# Patient Record
Sex: FEMALE | Race: WHITE | NOT HISPANIC OR LATINO | Employment: FULL TIME | URBAN - METROPOLITAN AREA
[De-identification: names, ages, dates, MRNs, and addresses within clinical notes are randomized per-mention and may not be internally consistent; named-entity substitution may affect disease eponyms.]

---

## 2017-01-26 ENCOUNTER — HOSPITAL ENCOUNTER (INPATIENT)
Facility: MEDICAL CENTER | Age: 22
LOS: 2 days | End: 2017-01-28
Attending: OBSTETRICS & GYNECOLOGY | Admitting: OBSTETRICS & GYNECOLOGY
Payer: COMMERCIAL

## 2017-01-26 LAB
BASOPHILS # BLD AUTO: 0.5 % (ref 0–1.8)
BASOPHILS # BLD: 0.1 K/UL (ref 0–0.12)
EOSINOPHIL # BLD AUTO: 0.18 K/UL (ref 0–0.51)
EOSINOPHIL NFR BLD: 1 % (ref 0–6.9)
ERYTHROCYTE [DISTWIDTH] IN BLOOD BY AUTOMATED COUNT: 40 FL (ref 35.9–50)
HCT VFR BLD AUTO: 37 % (ref 37–47)
HGB BLD-MCNC: 12.1 G/DL (ref 12–16)
HOLDING TUBE BB 8507: NORMAL
IMM GRANULOCYTES # BLD AUTO: 0.21 K/UL (ref 0–0.11)
IMM GRANULOCYTES NFR BLD AUTO: 1.1 % (ref 0–0.9)
LYMPHOCYTES # BLD AUTO: 1.95 K/UL (ref 1–4.8)
LYMPHOCYTES NFR BLD: 10.6 % (ref 22–41)
MCH RBC QN AUTO: 29 PG (ref 27–33)
MCHC RBC AUTO-ENTMCNC: 32.7 G/DL (ref 33.6–35)
MCV RBC AUTO: 88.7 FL (ref 81.4–97.8)
MONOCYTES # BLD AUTO: 1.43 K/UL (ref 0–0.85)
MONOCYTES NFR BLD AUTO: 7.7 % (ref 0–13.4)
NEUTROPHILS # BLD AUTO: 14.59 K/UL (ref 2–7.15)
NEUTROPHILS NFR BLD: 79.1 % (ref 44–72)
NRBC # BLD AUTO: 0 K/UL
NRBC BLD AUTO-RTO: 0 /100 WBC
PLATELET # BLD AUTO: 304 K/UL (ref 164–446)
PMV BLD AUTO: 9.9 FL (ref 9–12.9)
RBC # BLD AUTO: 4.17 M/UL (ref 4.2–5.4)
WBC # BLD AUTO: 18.5 K/UL (ref 4.8–10.8)

## 2017-01-26 PROCEDURE — A9270 NON-COVERED ITEM OR SERVICE: HCPCS | Performed by: OBSTETRICS & GYNECOLOGY

## 2017-01-26 PROCEDURE — 770007 HCHG ROOM/CARE - OB SEMI PRIVATE (*

## 2017-01-26 PROCEDURE — 85025 COMPLETE CBC W/AUTO DIFF WBC: CPT

## 2017-01-26 PROCEDURE — 0HQ9XZZ REPAIR PERINEUM SKIN, EXTERNAL APPROACH: ICD-10-PCS | Performed by: OBSTETRICS & GYNECOLOGY

## 2017-01-26 PROCEDURE — 700111 HCHG RX REV CODE 636 W/ 250 OVERRIDE (IP): Performed by: OBSTETRICS & GYNECOLOGY

## 2017-01-26 PROCEDURE — 700102 HCHG RX REV CODE 250 W/ 637 OVERRIDE(OP): Performed by: OBSTETRICS & GYNECOLOGY

## 2017-01-26 RX ORDER — ONDANSETRON 4 MG/1
4 TABLET, ORALLY DISINTEGRATING ORAL EVERY 6 HOURS PRN
Status: DISCONTINUED | OUTPATIENT
Start: 2017-01-26 | End: 2017-01-27

## 2017-01-26 RX ORDER — MISOPROSTOL 200 UG/1
800 TABLET ORAL
Status: DISCONTINUED | OUTPATIENT
Start: 2017-01-26 | End: 2017-01-27

## 2017-01-26 RX ORDER — METHYLERGONOVINE MALEATE 0.2 MG/ML
0.2 INJECTION INTRAVENOUS
Status: DISCONTINUED | OUTPATIENT
Start: 2017-01-26 | End: 2017-01-27

## 2017-01-26 RX ORDER — SODIUM CHLORIDE, SODIUM LACTATE, POTASSIUM CHLORIDE, CALCIUM CHLORIDE 600; 310; 30; 20 MG/100ML; MG/100ML; MG/100ML; MG/100ML
INJECTION, SOLUTION INTRAVENOUS CONTINUOUS
Status: DISCONTINUED | OUTPATIENT
Start: 2017-01-26 | End: 2017-01-27

## 2017-01-26 RX ORDER — OXYCODONE HYDROCHLORIDE AND ACETAMINOPHEN 5; 325 MG/1; MG/1
1 TABLET ORAL EVERY 4 HOURS PRN
Status: DISCONTINUED | OUTPATIENT
Start: 2017-01-26 | End: 2017-01-27

## 2017-01-26 RX ORDER — OXYCODONE AND ACETAMINOPHEN 10; 325 MG/1; MG/1
1 TABLET ORAL EVERY 4 HOURS PRN
Status: DISCONTINUED | OUTPATIENT
Start: 2017-01-26 | End: 2017-01-27

## 2017-01-26 RX ORDER — IBUPROFEN 800 MG/1
800 TABLET ORAL EVERY 6 HOURS PRN
Status: DISCONTINUED | OUTPATIENT
Start: 2017-01-26 | End: 2017-01-27

## 2017-01-26 RX ORDER — ACETAMINOPHEN 325 MG/1
650 TABLET ORAL EVERY 6 HOURS PRN
Status: ON HOLD | COMMUNITY
End: 2017-01-28

## 2017-01-26 RX ORDER — ONDANSETRON 2 MG/ML
4 INJECTION INTRAMUSCULAR; INTRAVENOUS EVERY 6 HOURS PRN
Status: DISCONTINUED | OUTPATIENT
Start: 2017-01-26 | End: 2017-01-27

## 2017-01-26 RX ADMIN — Medication 125 ML/HR: at 23:46

## 2017-01-26 RX ADMIN — SODIUM CHLORIDE, POTASSIUM CHLORIDE, SODIUM LACTATE AND CALCIUM CHLORIDE: 600; 310; 30; 20 INJECTION, SOLUTION INTRAVENOUS at 19:00

## 2017-01-26 RX ADMIN — ONDANSETRON 4 MG: 2 INJECTION, SOLUTION INTRAMUSCULAR; INTRAVENOUS at 20:20

## 2017-01-26 RX ADMIN — Medication 1 MILLI-UNITS/MIN: at 19:08

## 2017-01-26 RX ADMIN — FENTANYL CITRATE 100 MCG: 50 INJECTION, SOLUTION INTRAMUSCULAR; INTRAVENOUS at 20:56

## 2017-01-26 RX ADMIN — Medication 2000 ML/HR: at 22:40

## 2017-01-26 RX ADMIN — OXYCODONE HYDROCHLORIDE AND ACETAMINOPHEN 1 TABLET: 10; 325 TABLET ORAL at 22:57

## 2017-01-26 RX ADMIN — IBUPROFEN 800 MG: 800 TABLET, FILM COATED ORAL at 22:57

## 2017-01-26 RX ADMIN — FENTANYL CITRATE 100 MCG: 50 INJECTION, SOLUTION INTRAMUSCULAR; INTRAVENOUS at 21:55

## 2017-01-26 ASSESSMENT — LIFESTYLE VARIABLES
EVER_SMOKED: NEVER
ALCOHOL_USE: NO
DO YOU DRINK ALCOHOL: NO

## 2017-01-26 ASSESSMENT — COPD QUESTIONNAIRES
HAVE YOU SMOKED AT LEAST 100 CIGARETTES IN YOUR ENTIRE LIFE: NO/DON'T KNOW
DO YOU EVER COUGH UP ANY MUCUS OR PHLEGM?: NO/ONLY WITH OCCASIONAL COLDS OR INFECTIONS
DURING THE PAST 4 WEEKS HOW MUCH DID YOU FEEL SHORT OF BREATH: NONE/LITTLE OF THE TIME

## 2017-01-26 ASSESSMENT — PATIENT HEALTH QUESTIONNAIRE - PHQ9
SUM OF ALL RESPONSES TO PHQ9 QUESTIONS 1 AND 2: 0
SUM OF ALL RESPONSES TO PHQ QUESTIONS 1-9: 0
1. LITTLE INTEREST OR PLEASURE IN DOING THINGS: NOT AT ALL
2. FEELING DOWN, DEPRESSED, IRRITABLE, OR HOPELESS: NOT AT ALL

## 2017-01-26 NOTE — IP AVS SNAPSHOT
1/28/2017          Nick Bourgeois  [address]  [facility] [address]    Dear Nick:    [facility] wants to ensure your discharge home is safe and you or your loved ones have had all your questions answered regarding your care after you leave the hospital.    You may receive a telephone call within two days of your discharge.  This call is to make certain you understand your discharge instructions as well as ensure we provided you with the best care possible during your stay with us.     The call will only last approximately 3-5 minutes and will be done by a nurse.    Once again, we want to ensure your discharge home is safe and that you have a clear understanding of any next steps in your care.  If you have any questions or concerns, please do not hesitate to contact us, we are here for you.  Thank you for choosing [facility] for your healthcare needs.    Sincerely,    Paddy Mares    [facility]

## 2017-01-26 NOTE — IP AVS SNAPSHOT
After Visit Summary                                                                                                                Nick Bourgeois   MRN: [id number]    Department:  POST PARTUM 31   2017           Follow-up Information     1. Follow up with Corinne E Capurro, M.D. In 6 weeks.    Specialty:  OB/Gyn    Contact information    [address]  [address]  [place] [address]  [phone]         I assume responsibility for securing a follow-up Fay Screening blood test on my baby within the specified date range.    -                  Discharge Instructions       POSTPARTUM DISCHARGE INSTRUCTIONS FOR MOM    YOB: 1995   Age: 21 y.o.               Admit Date: 2017     Discharge Date: 2017  Attending Doctor:  Corinne E Capurro, M.D.                  Allergies:  Review of patient's allergies indicates no known allergies.    Discharged to home by car. Discharged via wheelchair, hospital escort: Yes.  Special equipment needed: Not Applicable  Belongings with: Personal  Be sure to schedule a follow-up appointment with your primary care doctor or any specialists as instructed.     Discharge Plan:   Diet Plan: Discussed  Activity Level: Discussed  Confirmed Follow up Appointment: Patient to Call and Schedule Appointment  Confirmed Symptoms Management: Discussed  Medication Reconciliation Updated: Yes  Influenza Vaccine Indication: Patient Refuses    REASONS TO CALL YOUR OBSTETRICIAN:  1.   Persistent fever or shaking chills (Temperature higher than 100.4)  2.   Heavy bleeding (soaking more than 1 pad per hour); Passing clots  3.   Foul odor from vagina  4.   Mastitis (Breast infection; breast pain, chills, fever, redness)  5.   Urinary pain, burning or frequency  6.   Episiotomy infection  7.   Abdominal incision infection  8.   Severe depression longer than 24 hours    HAND WASHING  · Prior to handling the baby.  · Before breastfeeding or bottle feeding baby.  · After using the  "bathroom or changing the baby's diaper.    WOUND CARE  Ask your physician for additional care instructions.  In general:    ·  Incision:      · Keep clean and dry.    · Do NOT lift anything heavier than your baby for up to 6 weeks.    · There should not be any opening or pus.      VAGINAL CARE  · Nothing inside vagina for 6 weeks: no sexual intercourse, tampons or douching.  · Bleeding may continue for 2-4 weeks.  Amount may vary.    · Call your physician for heavy bleeding which means soaking more than 1 pad per hour    BIRTH CONTROL  · It is possible to become pregnant at any time after delivery and while breastfeeding.  · Plan to discuss a method of birth control with your physician at your follow up visit. visit.    DIET AND ELIMINATION  · Eating more fiber (bran cereal, fruits, and vegetables) and drinking plenty of fluids will help to avoid constipation.  · Urinary frequency after childbirth is normal.    POSTPARTUM BLUES  During the first few days after birth, you may experience a sense of the \"blues\" which may include impatience, irritability or even crying.  These feeling come and go quickly.  However, as many as 1 in 10 women experience emotional symptoms known as postpartum depression.    Postpartum depression:  May start as early as the second or third day after delivery or take several weeks or months to develop.  Symptoms of \"blues\" are present, but are more intense:  Crying spells; loss of appetite; feelings of hopelessness or loss of control; fear of touching the baby; over concern or no concern at all about the baby; little or no concern about your own appearance/caring for yourself; and/or inability to sleep or excessive sleeping.  Contact your physician if you are experiencing any of these symptoms.    Crisis Hotline:  · National Crisis Hotline:  1-011-ZBYFLSP  Or 1-422.311.2013  · [place] Crisis Hotline:  1-899.473.5346  Or 941-228-2110    PREVENTING SHAKEN BABY:  If you are angry or " "stressed, PUT THE BABY IN THE CRIB, step away, take some deep breaths, and wait until you are calm to care for the baby.  DO NOT SHAKE THE BABY.  You are not alone, call a supporter for help.    · Crisis Call Center 24/7 crisis line 705-498-1655 or 1-766.438.2152  · You can also text them, text \"ANSWER\" to 146637    QUIT SMOKING/TOBACCO USE:  I understand the use of any tobacco products increases my chance of suffering from future heart disease and could cause other illnesses which may shorten my life. Quitting the use of tobacco products is the single most important thing I can do to improve my health. For further information on smoking / tobacco cessation call a Toll Free Quit Line at 1-393.165.9508 (*National Cancer [place]) or 1-120.902.2941 (American Lung Association) or you can access the web based program at www.lungusa.org.    · [place] Tobacco Users Help Line:  (590) 786-4209       Toll Free: 1-364.396.9118  · Quit Tobacco Program [facility] (318)705-0165    DEPRESSION / SUICIDE RISK:  As you are discharged from this [facility], it is important to learn how to keep safe from harming yourself.    Recognize the warning signs:  · Abrupt changes in personality, positive or negative- including increase in energy   · Giving away possessions  · Change in eating patterns- significant weight changes-  positive or negative  · Change in sleeping patterns- unable to sleep or sleeping all the time   · Unwillingness or inability to communicate  · Depression  · Unusual sadness, discouragement and loneliness  · Talk of wanting to die  · Neglect of personal appearance   · Rebelliousness- reckless behavior  · Withdrawal from people/activities they love  · Confusion- inability to concentrate     If you or a loved one observes any of these behaviors or has concerns about self-harm, here's what you can do:  · Talk about it- your feelings and reasons for harming yourself  · Remove any means " that you might use to hurt yourself (examples: pills, rope, extension cords, firearm)  · Get professional help from the community (Mental Health, Substance Abuse, psychological counseling)  · Do not be alone:Call your Safe Contact- someone whom you trust who will be there for you.  · Call your local CRISIS HOTLINE 654-2642 or 549-901-6041  · Call your local Children's Mobile Crisis Response Team [place] (539) 311-6043 or www[facility] Anesthesia  · Call the toll free National Suicide Prevention Hotlines   · National Suicide Prevention Lifeline 180-806-KKEB (1460)  · National Hope Line Network 800-SUICIDE (045-3322)    DISCHARGE SURVEY:  Thank you for choosing [facility].  We hope we provided you with very good care.  You may be receiving a survey in the mail.  Please fill it out.  Your opinion is valuable to us.    ADDITIONAL EDUCATIONAL MATERIALS GIVEN TO PATIENT:        My signature on this form indicates that:  1.  I have reviewed and understand the above information  2.  My questions regarding this information have been answered to my satisfaction.  3.  I have formulated a plan with my discharge nurse to obtain my prescribed medication for home.         Discharge Medication Instructions:    Below are the medications your physician expects you to take upon discharge:    Review all your home medications and newly ordered medications with your doctor and/or pharmacist. Follow medication instructions as directed by your doctor and/or pharmacist.    Please keep your medication list with you and share with your physician.               Medication List      START taking these medications        Instructions    ibuprofen 800 MG Tabs   Last time this was given:  800 mg on 1/27/2017 12:03 PM   Commonly known as:  MOTRIN    Take 1 Tab by mouth every 8 hours as needed (Cramping).   Dose:  800 mg       oxycodone-acetaminophen 5-325 MG Tabs   Last time this was given:  1 Tab on 1/28/2017  6:37 AM   Commonly known as:   PERCOCET    Take 1 Tab by mouth every four hours as needed for Moderate Pain ((Pain Scale 4-6); If acetaminophen ineffective).   Dose:  1 Tab         CONTINUE taking these medications        Instructions    PRENATAL PO    Take 1 Tab by mouth every day.   Dose:  1 Tab         STOP taking these medications     acetaminophen 325 MG Tabs   Commonly known as:  TYLENOL       NYQUIL PO       ondansetron 4 MG Tabs tablet   Commonly known as:  ZOFRAN       ondansetron 4 MG Tbdp   Commonly known as:  ZOFRAN ODT               Crisis Hotline:     [place] Crisis Hotline:  0-301-EFIVWSO or 1-591.331.8920    [place] Crisis Hotline:    1-759.288.8148 or 791-687-0253        Disclaimer           _____________________________________                     __________       ________       Patient/Mother Signature or Legal                          Date                   Time

## 2017-01-26 NOTE — IP AVS SNAPSHOT
LiveQoS Access Code: [email or web address]  Expires: 2/1/2017  8:21 AM    LiveQoS  A secure, online tool to manage your health information     [employer]’s LiveQoS® is a secure, online tool that connects you to your personalized health information from the privacy of your home -- day or night - making it very easy for you to manage your healthcare. Once the activation process is completed, you can even access your medical information using the LiveQoS winsome, which is available for free in the Apple Winsome store or Google Play store.     LiveQoS provides the following levels of access (as shown below):   My Chart Features   [facility] Primary Care Doctor [facility]  Specialists [facility]  Urgent  Care Non-[facility]  Primary Care  Doctor   Email your healthcare team securely and privately 24/7 X X X X   Manage appointments: schedule your next appointment; view details of past/upcoming appointments X      Request prescription refills. X      View recent personal medical records, including lab and immunizations X X X X   View health record, including health history, allergies, medications X X X X   Read reports about your outpatient visits, procedures, consult and ER notes X X X X   See your discharge summary, which is a recap of your hospital and/or ER visit that includes your diagnosis, lab results, and care plan. X X       How to register for LiveQoS:  1. Go to  [email or web address].  2. Click on the Sign Up Now box, which takes you to the New Member Sign Up page. You will need to provide the following information:  a. Enter your LiveQoS Access Code exactly as it appears at the top of this page. (You will not need to use this code after you’ve completed the sign-up process. If you do not sign up before the expiration date, you must request a new code.)   b. Enter your date of birth.   c. Enter your home email address.   d. Click Submit, and follow the next screen’s instructions.  3. Create a LiveQoS ID. This will be your LiveQoS  login ID and cannot be changed, so think of one that is secure and easy to remember.  4. Create a [employer] password. You can change your password at any time.  5. Enter your Password Reset Question and Answer. This can be used at a later time if you forget your password.   6. Enter your e-mail address. This allows you to receive e-mail notifications when new information is available in [employer].  7. Click Sign Up. You can now view your health information.    For assistance activating your [employer] account, call [phone]

## 2017-01-27 PROCEDURE — 304965 HCHG RECOVERY SERVICES

## 2017-01-27 PROCEDURE — 36415 COLL VENOUS BLD VENIPUNCTURE: CPT

## 2017-01-27 PROCEDURE — A9270 NON-COVERED ITEM OR SERVICE: HCPCS | Performed by: OBSTETRICS & GYNECOLOGY

## 2017-01-27 PROCEDURE — 770002 HCHG ROOM/CARE - OB PRIVATE (112)

## 2017-01-27 PROCEDURE — 59409 OBSTETRICAL CARE: CPT

## 2017-01-27 PROCEDURE — 700102 HCHG RX REV CODE 250 W/ 637 OVERRIDE(OP): Performed by: OBSTETRICS & GYNECOLOGY

## 2017-01-27 RX ORDER — OXYCODONE HYDROCHLORIDE AND ACETAMINOPHEN 5; 325 MG/1; MG/1
2 TABLET ORAL EVERY 4 HOURS PRN
Status: DISCONTINUED | OUTPATIENT
Start: 2017-01-27 | End: 2017-01-28 | Stop reason: HOSPADM

## 2017-01-27 RX ORDER — IBUPROFEN 800 MG/1
800 TABLET ORAL EVERY 8 HOURS PRN
Status: DISCONTINUED | OUTPATIENT
Start: 2017-01-27 | End: 2017-01-28 | Stop reason: HOSPADM

## 2017-01-27 RX ORDER — MISOPROSTOL 200 UG/1
800 TABLET ORAL PRN
Status: DISCONTINUED | OUTPATIENT
Start: 2017-01-27 | End: 2017-01-28 | Stop reason: HOSPADM

## 2017-01-27 RX ORDER — OXYCODONE HYDROCHLORIDE AND ACETAMINOPHEN 5; 325 MG/1; MG/1
1 TABLET ORAL EVERY 4 HOURS PRN
Status: DISCONTINUED | OUTPATIENT
Start: 2017-01-27 | End: 2017-01-28 | Stop reason: HOSPADM

## 2017-01-27 RX ORDER — ONDANSETRON 2 MG/ML
4 INJECTION INTRAMUSCULAR; INTRAVENOUS EVERY 6 HOURS PRN
Status: DISCONTINUED | OUTPATIENT
Start: 2017-01-27 | End: 2017-01-28 | Stop reason: HOSPADM

## 2017-01-27 RX ORDER — DOCUSATE SODIUM 100 MG/1
100 CAPSULE, LIQUID FILLED ORAL 2 TIMES DAILY PRN
Status: DISCONTINUED | OUTPATIENT
Start: 2017-01-27 | End: 2017-01-28 | Stop reason: HOSPADM

## 2017-01-27 RX ORDER — ACETAMINOPHEN 325 MG/1
325 TABLET ORAL EVERY 4 HOURS PRN
Status: DISCONTINUED | OUTPATIENT
Start: 2017-01-27 | End: 2017-01-28 | Stop reason: HOSPADM

## 2017-01-27 RX ADMIN — OXYCODONE HYDROCHLORIDE AND ACETAMINOPHEN 1 TABLET: 5; 325 TABLET ORAL at 12:03

## 2017-01-27 RX ADMIN — OXYCODONE HYDROCHLORIDE AND ACETAMINOPHEN 2 TABLET: 5; 325 TABLET ORAL at 06:07

## 2017-01-27 RX ADMIN — IBUPROFEN 800 MG: 800 TABLET, FILM COATED ORAL at 12:03

## 2017-01-27 ASSESSMENT — PAIN SCALES - GENERAL
PAINLEVEL_OUTOF10: 6
PAINLEVEL_OUTOF10: 0

## 2017-01-27 NOTE — PROGRESS NOTES
, due , 36.4 weeks  : Pt presents to L&D with c/o SROM at 1730 today. Pt states that she is still leaking clear fluid. Denies VB and UCs, +FM. Monitors placed. VSS. Sterile speculum placed, large clear pooling present. SVE by RN, /-1.   : Dr. Soto notified of pt. Orders received for admission. Admission procedures started.  2015: Dr. Soto at bedside.  : Pt called out reporting an urge to push. SVE by RN, /. Pt educated on importance of not bearing down to prevent cervical swelling. Pt verbalized understanding. Labor support provided.   : Pt reports all she wants to do is push. SVE by RN, no change. Dr. Soto updated.  : Report given to RADHA Horn RN. POC discussed.

## 2017-01-27 NOTE — DELIVERY NOTE
DATE OF SERVICE:  2017    This is a 21-year-old .  A patient of Dr. Aquino's with an EDC of   , who was admitted earlier this evening with ruptured membranes   at 36 and 4/7th weeks.  Patient had an uncomplicated pregnancy.  She arrived   complaining of leaking of fluid.  On arrival, she was found to be grossly   ruptured.  She was admitted, she is 4, 80%, -1 station.  She was started on   Pitocin as she was not tia and got into a good labor pattern.  Within   several hours, she was complete and pushed for approximately 35 _____ to   deliver a female infant from OA presentation.  Delivery of the head was manual   assisted.  Shoulders and rest of body then followed without difficulty.    Baby's mouth and nares were bulb suctioned.  Baby was placed on mom's abdomen   with a vigorous cry.  Cord was doubly clamped and cut.  Placenta then   delivered spontaneously intact, 3-vessel cord was noted.  A small first-degree   laceration was repaired in single interrupted fashion using 3-0 Vicryl.    There were no complications.  Sponge, needle and instrument counts were   correct.  Excellent uterine tone.       ____________________________________     MD NILESH Black / CARLY    DD:  2017 23:07:29  DT:  2017 23:25:30    D#:  [id number]  Job#:  [id number]    cc: Angie Zayas CNM

## 2017-01-27 NOTE — CARE PLAN
Problem: Knowledge Deficit  Goal: Patient/Support person demonstrates understanding regarding the progression of labor, available options and participates in decision-making process  Outcome: PROGRESSING AS EXPECTED  POC discussed with pt and FOB. All questions answered.    Problem: Pain  Goal: Alleviation of Pain or a reduction in pain to the patient’s comfort goal  Outcome: PROGRESSING AS EXPECTED  Pt denies pain upon admission. Plans to not receive IV pain meds as needed.

## 2017-01-27 NOTE — PROGRESS NOTES
2200 Received bedside report from AGUEDA Elias RN, assumed care. Pt was just recently given fentanyl per MAR-she is breathing through contractions with great control, states she is experiencing a lot of pressure and wants to push, pt encouraged to breathe.  2216 SVE as noted, pt complete, RN to start pushing.  2235 viable baby girl born via Dr. Soto, APGARs 8/9.  2238 placenta delivered spontaneously, intact.  2257 pain medication admin per MAR. Pt resting comfotably in bed with baby skin-to-skin. Fundus firm and light, VSS.  0030 pt up to bathroom to void, tolerated well, pt transferred to PP with infant in arms, both in stable condition.

## 2017-01-27 NOTE — H&P
IDENTIFICATION:  This is a 21-year-old  patient of Dr. Aquino.  EDC of   , which makes her 36 and 4/7th weeks who is admitted with ruptured   membranes.    HISTORY OF PRESENT ILLNESS:  Patient has been followed by Dr. Aquino.  Her   pregnancy has been uncomplicated.  She is GBS negative.  She presented   complaining of leaking of fluid.  On arrival, she was found to be grossly   ruptured and therefore, admitted.    PAST MEDICAL HISTORY:  None.    PAST SURGICAL HISTORY:  None.    ALLERGIES:  She has no known drug allergies.    SOCIAL HISTORY:  She is currently not working.  She is involved with her   boyfriend, Will, who is here with her this evening.  She denies the use of   tobacco, alcohol or drugs.    PHYSICAL EXAMINATION:  VITAL SIGNS:  Blood pressures are normotensive.  She is afebrile.  GENERAL:  She is pleasant, cooperative and appears her stated age.  ABDOMEN:  Soft.  Leopold's 6 pounds.  GENITOURINARY:  Sterile vaginal exam, she was 4, 80, -1.  Contractions are   irregular.  Pitocin has been started.    ASSESSMENT AND PLAN:  A 21-year-old , at 36 weeks and 4 days here with   ruptured membranes, Group B Streptococcus negative, currently on Pitocin.  We   will follow her in labor.  She can have an epidural if she decides and she can   also have fentanyl.       ____________________________________     MD NILESH Black / CARLY    DD:  2017 20:21:20  DT:  2017 20:55:17    D#:  [id number]  Job#:  [id number]

## 2017-01-27 NOTE — CARE PLAN
Problem: Altered physiologic condition related to immediate post-delivery state and potential for bleeding/hemorrhage  Goal: Patient physiologically stable as evidenced by normal lochia, palpable uterine involution and vital signs within normal limits  Outcome: PROGRESSING AS EXPECTED  Fundus is firm, lochia is light. IV pitocin currently infusing at 125ml/hr.     Problem: Potential for postpartum infection related to presence of episiotomy/vaginal tear and/or uterine contamination  Goal: Patient will be absent from signs and symptoms of infection  Outcome: PROGRESSING AS EXPECTED  Continue to monitor vital signs, and provide routine PP care.

## 2017-01-27 NOTE — PROGRESS NOTES
PPD#1  S) pt without c/o  O) vss  fundus' firm  Ext' no edema  Hgb Pending  A/P PPD#1, s/p   Stable,continue orders

## 2017-01-27 NOTE — CARE PLAN
Problem: Safety  Goal: Will remain free from injury  Outcome: PROGRESSING AS EXPECTED  Bed in lowest position, treaded socks on, call light within reach. Pt aware to call if assistance is needed. Verbalized understanding    Problem: Pain Management  Goal: Pain level will decrease to patient’s comfort goal  Outcome: PROGRESSING AS EXPECTED  Pt updated on medications available and when next dosing is due. Medicated per MAR.

## 2017-01-27 NOTE — PROGRESS NOTES
Patient here to room, via wheelchair. Report received from JESUSITA Ornelas. IV pitocin currently infusing at 125 ml/hr. Ceci to bring infant to nursery for transition. Assessment completed. Fundus is firm, lochia is light. Discussed plan of care for this shift. Oriented to room, call light, beto light, and emergency cord. Encouraged to call for needs or concerns. Patient states she will call for pain medications as needed.

## 2017-01-28 VITALS
BODY MASS INDEX: 27.66 KG/M2 | RESPIRATION RATE: 18 BRPM | HEIGHT: 65 IN | HEART RATE: 56 BPM | WEIGHT: 166 LBS | TEMPERATURE: 97.3 F | SYSTOLIC BLOOD PRESSURE: 109 MMHG | DIASTOLIC BLOOD PRESSURE: 68 MMHG | OXYGEN SATURATION: 98 %

## 2017-01-28 LAB
ERYTHROCYTE [DISTWIDTH] IN BLOOD BY AUTOMATED COUNT: 40.6 FL (ref 35.9–50)
HCT VFR BLD AUTO: 36.7 % (ref 37–47)
HGB BLD-MCNC: 11.7 G/DL (ref 12–16)
MCH RBC QN AUTO: 29 PG (ref 27–33)
MCHC RBC AUTO-ENTMCNC: 31.9 G/DL (ref 33.6–35)
MCV RBC AUTO: 91.1 FL (ref 81.4–97.8)
PLATELET # BLD AUTO: 252 K/UL (ref 164–446)
PMV BLD AUTO: 10.1 FL (ref 9–12.9)
RBC # BLD AUTO: 4.03 M/UL (ref 4.2–5.4)
WBC # BLD AUTO: 11.4 K/UL (ref 4.8–10.8)

## 2017-01-28 PROCEDURE — 700102 HCHG RX REV CODE 250 W/ 637 OVERRIDE(OP): Performed by: OBSTETRICS & GYNECOLOGY

## 2017-01-28 PROCEDURE — A9270 NON-COVERED ITEM OR SERVICE: HCPCS | Performed by: OBSTETRICS & GYNECOLOGY

## 2017-01-28 PROCEDURE — 36415 COLL VENOUS BLD VENIPUNCTURE: CPT

## 2017-01-28 PROCEDURE — 85027 COMPLETE CBC AUTOMATED: CPT

## 2017-01-28 RX ORDER — OXYCODONE HYDROCHLORIDE AND ACETAMINOPHEN 5; 325 MG/1; MG/1
1 TABLET ORAL EVERY 4 HOURS PRN
Qty: 15 TAB | Refills: 0 | Status: SHIPPED | OUTPATIENT
Start: 2017-01-28 | End: 2017-10-06

## 2017-01-28 RX ORDER — IBUPROFEN 800 MG/1
800 TABLET ORAL EVERY 8 HOURS PRN
Qty: 30 TAB | Refills: 1 | Status: SHIPPED | OUTPATIENT
Start: 2017-01-28 | End: 2017-10-06

## 2017-01-28 RX ADMIN — OXYCODONE HYDROCHLORIDE AND ACETAMINOPHEN 1 TABLET: 5; 325 TABLET ORAL at 06:37

## 2017-01-28 ASSESSMENT — PAIN SCALES - GENERAL
PAINLEVEL_OUTOF10: 0
PAINLEVEL_OUTOF10: 3
PAINLEVEL_OUTOF10: 7

## 2017-01-28 NOTE — PROGRESS NOTES
Progress Note    Nick Bourgeois   PP day 1  Chief Admitting Dx: Pregnancy  Indication for care in labor or delivery  Delivery Type: vaginal, spontaneous      Subjective:  Ambulating: voiding, tolerating diet, normal lochia  Filed Vitals:    01/27/17 0050 01/27/17 0400 01/27/17 0800 01/27/17 2000   BP: 109/71 106/66 101/65 97/55   Pulse: 85 86 67 76   Temp: 36.7 °C (98.1 °F) 36.2 °C (97.2 °F) 36.5 °C (97.7 °F) 36.3 °C (97.4 °F)   Resp: 16 16 18 16   Height:       Weight:       SpO2: 95% 96% 95% 97%       Exam:  Abdomen: soft nt/nd firm fundus  Ext: no calf pain suzanne Le    Labs:   Recent Results (from the past 24 hour(s))   CBC WITHOUT DIFFERENTIAL    Collection Time: 01/28/17  3:48 AM   Result Value Ref Range    WBC 11.4 (H) 4.8 - 10.8 K/uL    RBC 4.03 (L) 4.20 - 5.40 M/uL    Hemoglobin 11.7 (L) 12.0 - 16.0 g/dL    Hematocrit 36.7 (L) 37.0 - 47.0 %    MCV 91.1 81.4 - 97.8 fL    MCH 29.0 27.0 - 33.0 pg    MCHC 31.9 (L) 33.6 - 35.0 g/dL    RDW 40.6 35.9 - 50.0 fL    Platelet Count 252 164 - 446 K/uL    MPV 10.1 9.0 - 12.9 fL       Assessment:  Ppd 2    Plan:  Discharge home  Encourage ambulation  Pelvic rest, no heavy lifting/pulling /pushing  F/u 6 weeks postpartum  Continue prenatal vitamins daily  Give Rhogam if Rh negative and indicated  Give MMR if indcated prior to discharge  Encourage breastfeeding       Romina Blankenship M.D.

## 2017-01-28 NOTE — CARE PLAN
Problem: Altered physiologic condition related to immediate post-delivery state and potential for bleeding/hemorrhage  Goal: Patient physiologically stable as evidenced by normal lochia, palpable uterine involution and vital signs within normal limits  Outcome: PROGRESSING AS EXPECTED  Patient is stable. Lochia light. Fundus firm. Vital signs are within defined limits.     Problem: Potential for postpartum infection related to presence of episiotomy/vaginal tear and/or uterine contamination  Goal: Patient will be absent from signs and symptoms of infection  Outcome: PROGRESSING AS EXPECTED  Vital signs are within normal limits. No signs or symptoms of infection noted.

## 2017-01-28 NOTE — PROGRESS NOTES
Assessment completed. Fundus remains firm, lochia is light. Patient declines pain medication at this time. Will call for pain intervention as needed. Encouraged to call for needs or concerns. Call light in reach.

## 2017-01-28 NOTE — CARE PLAN
Problem: Alteration in comfort related to episiotomy, vaginal repair and/or after birth pains  Goal: Patient is able to ambulate, care for self and infant  Outcome: PROGRESSING AS EXPECTED  Patient is ambulatory in room, able to do self cares and infant cares.    Problem: Potential knowledge deficit related to lack of understanding of self and  care  Goal: Patient will verbalize understanding of self and infant care  Outcome: PROGRESSING AS EXPECTED  Patient is appropriate with infant and able to do cares without assistance.

## 2017-01-28 NOTE — DISCHARGE INSTRUCTIONS
POSTPARTUM DISCHARGE INSTRUCTIONS FOR MOM    YOB: 1995   Age: 21 y.o.               Admit Date: 2017     Discharge Date: 2017  Attending Doctor:  Corinne E Capurro, M.D.                  Allergies:  Review of patient's allergies indicates no known allergies.    Discharged to home by car. Discharged via wheelchair, hospital escort: Yes.  Special equipment needed: Not Applicable  Belongings with: Personal  Be sure to schedule a follow-up appointment with your primary care doctor or any specialists as instructed.     Discharge Plan:   Diet Plan: Discussed  Activity Level: Discussed  Confirmed Follow up Appointment: Patient to Call and Schedule Appointment  Confirmed Symptoms Management: Discussed  Medication Reconciliation Updated: Yes  Influenza Vaccine Indication: Patient Refuses    REASONS TO CALL YOUR OBSTETRICIAN:  1.   Persistent fever or shaking chills (Temperature higher than 100.4)  2.   Heavy bleeding (soaking more than 1 pad per hour); Passing clots  3.   Foul odor from vagina  4.   Mastitis (Breast infection; breast pain, chills, fever, redness)  5.   Urinary pain, burning or frequency  6.   Episiotomy infection  7.   Abdominal incision infection  8.   Severe depression longer than 24 hours    HAND WASHING  · Prior to handling the baby.  · Before breastfeeding or bottle feeding baby.  · After using the bathroom or changing the baby's diaper.    WOUND CARE  Ask your physician for additional care instructions.  In general:    ·  Incision:      · Keep clean and dry.    · Do NOT lift anything heavier than your baby for up to 6 weeks.    · There should not be any opening or pus.      VAGINAL CARE  · Nothing inside vagina for 6 weeks: no sexual intercourse, tampons or douching.  · Bleeding may continue for 2-4 weeks.  Amount may vary.    · Call your physician for heavy bleeding which means soaking more than 1 pad per hour    BIRTH CONTROL  · It is possible to become pregnant at any  "time after delivery and while breastfeeding.  · Plan to discuss a method of birth control with your physician at your follow up visit. visit.    DIET AND ELIMINATION  · Eating more fiber (bran cereal, fruits, and vegetables) and drinking plenty of fluids will help to avoid constipation.  · Urinary frequency after childbirth is normal.    POSTPARTUM BLUES  During the first few days after birth, you may experience a sense of the \"blues\" which may include impatience, irritability or even crying.  These feeling come and go quickly.  However, as many as 1 in 10 women experience emotional symptoms known as postpartum depression.    Postpartum depression:  May start as early as the second or third day after delivery or take several weeks or months to develop.  Symptoms of \"blues\" are present, but are more intense:  Crying spells; loss of appetite; feelings of hopelessness or loss of control; fear of touching the baby; over concern or no concern at all about the baby; little or no concern about your own appearance/caring for yourself; and/or inability to sleep or excessive sleeping.  Contact your physician if you are experiencing any of these symptoms.    Crisis Hotline:  · [place] Crisis Hotline:  2-005-GFOUUBE  Or 1-795.767.7677  · [place] Crisis Hotline:  1-758.160.1628  Or 632-116-3213    PREVENTING SHAKEN BABY:  If you are angry or stressed, PUT THE BABY IN THE CRIB, step away, take some deep breaths, and wait until you are calm to care for the baby.  DO NOT SHAKE THE BABY.  You are not alone, call a supporter for help.    · Crisis [facility] Center 24/7 crisis line 506-156-1024 or 1-383.922.4310  · You can also text them, text \"ANSWER\" to 376466    QUIT SMOKING/TOBACCO USE:  I understand the use of any tobacco products increases my chance of suffering from future heart disease and could cause other illnesses which may shorten my life. Quitting the use of tobacco products is the single most important thing I can do to " improve my health. For further information on smoking / tobacco cessation call a Toll Free Quit Line at 1-823.573.7853 (*[employer] [facility] [place]) or 1-281.982.7889 (American Lung Association) or you can access the web based program at www.lungusa.org.    · [place] Tobacco Users Help Line:  (184) 920-9198       Toll Free: 1-824.602.7294  · Quit Tobacco Program [facility] (094)637-4855    DEPRESSION / SUICIDE RISK:  As you are discharged from this [facility], it is important to learn how to keep safe from harming yourself.    Recognize the warning signs:  · Abrupt changes in personality, positive or negative- including increase in energy   · Giving away possessions  · Change in eating patterns- significant weight changes-  positive or negative  · Change in sleeping patterns- unable to sleep or sleeping all the time   · Unwillingness or inability to communicate  · Depression  · Unusual sadness, discouragement and loneliness  · Talk of wanting to die  · Neglect of personal appearance   · Rebelliousness- reckless behavior  · Withdrawal from people/activities they love  · Confusion- inability to concentrate     If you or a loved one observes any of these behaviors or has concerns about self-harm, here's what you can do:  · Talk about it- your feelings and reasons for harming yourself  · Remove any means that you might use to hurt yourself (examples: pills, rope, extension cords, firearm)  · Get professional help from the community (Mental Health, Substance Abuse, psychological counseling)  · Do not be alone:Call your Safe Contact- someone whom you trust who will be there for you.  · Call your local CRISIS HOTLINE 642-4557 or 449-841-2352  · Call your local Children's Mobile Crisis Response Team [place] (004) 743-0951 or www.[employer]  · Call the toll free National Suicide Prevention Hotlines   · National Suicide Prevention Lifeline 292-308-OVIQ (9673)  · National Hope Line  [facility] 800-SUICIDE (090-6984)    DISCHARGE SURVEY:  Thank you for choosing [facility].  We hope we provided you with very good care.  You may be receiving a survey in the mail.  Please fill it out.  Your opinion is valuable to us.    ADDITIONAL EDUCATIONAL MATERIALS GIVEN TO PATIENT:        My signature on this form indicates that:  1.  I have reviewed and understand the above information  2.  My questions regarding this information have been answered to my satisfaction.  3.  I have formulated a plan with my discharge nurse to obtain my prescribed medication for home.

## 2017-05-26 ENCOUNTER — APPOINTMENT (OUTPATIENT)
Dept: MEDICAL GROUP | Facility: MEDICAL CENTER | Age: 22
End: 2017-05-26
Payer: COMMERCIAL

## 2017-10-06 ENCOUNTER — OFFICE VISIT (OUTPATIENT)
Dept: MEDICAL GROUP | Facility: MEDICAL CENTER | Age: 22
End: 2017-10-06
Payer: COMMERCIAL

## 2017-10-06 VITALS
RESPIRATION RATE: 16 BRPM | HEIGHT: 66 IN | SYSTOLIC BLOOD PRESSURE: 116 MMHG | TEMPERATURE: 98.2 F | DIASTOLIC BLOOD PRESSURE: 78 MMHG | BODY MASS INDEX: 22.47 KG/M2 | HEART RATE: 76 BPM | WEIGHT: 139.8 LBS | OXYGEN SATURATION: 99 %

## 2017-10-06 DIAGNOSIS — J01.00 ACUTE NON-RECURRENT MAXILLARY SINUSITIS: ICD-10-CM

## 2017-10-06 PROCEDURE — 99213 OFFICE O/P EST LOW 20 MIN: CPT | Performed by: PHYSICIAN ASSISTANT

## 2017-10-06 ASSESSMENT — PATIENT HEALTH QUESTIONNAIRE - PHQ9: CLINICAL INTERPRETATION OF PHQ2 SCORE: 0

## 2017-10-06 NOTE — ASSESSMENT & PLAN NOTE
This is a 21-year-old female complains of a three-day history of sinus congestion of the maxillary sinuses. Associated bilateral ear pain. She had a temperature yesterday of 101. Denies any current fever. No shortness breath or chest pain. No coughing. Positive sick contacts at home.  and 2 young children are sick as well as father-in-law. She took Tylenol yesterday. Last year had swimmer's ear in the left ear. She swims often.

## 2017-10-06 NOTE — PROGRESS NOTES
"Subjective:   Nick Bourgeois is a 21 y.o. female here today for bilateral ear pain for 3 days.    Acute maxillary sinusitis  This is a 21-year-old female complains of a three-day history of sinus congestion of the maxillary sinuses. Associated bilateral ear pain. She had a temperature yesterday of 101. Denies any current fever. No shortness breath or chest pain. No coughing. Positive sick contacts at home.  and 2 young children are sick as well as father-in-law. She took Tylenol yesterday. Last year had swimmer's ear in the left ear. She swims often.       Current medicines (including changes today)  No current outpatient prescriptions on file.     No current facility-administered medications for this visit.      She  has a past medical history of Ovarian cyst.    ROS   No chest pain, no shortness of breath, no abdominal pain and all other systems were reviewed and are negative.       Objective:     Blood pressure 116/78, pulse 76, temperature 36.8 °C (98.2 °F), resp. rate 16, height 1.664 m (5' 5.5\"), weight 63.4 kg (139 lb 12.8 oz), SpO2 99 %. Body mass index is 22.91 kg/m².   Physical Exam:  Constitutional: Alert, no distress.  Skin: Warm, dry, good turgor, no rashes in visible areas.  Eye: Equal, round and reactive, conjunctiva clear, lids normal.  ENMT: Lips without lesions, good dentition, oropharynx clear.Left TM with some scarring noted. Otherwise unremarkable. No erythema or effusion noted. Right side with some cerumen noted. Otherwise TM is clear.  Neck: Trachea midline, no masses.   Lymph: No cervical or supraclavicular lymphadenopathy  Respiratory: Unlabored respiratory effort, lungs clear to auscultation, no wheezes, no ronchi.  Cardiovascular: Normal S1, S2, no murmur, no edema.  Psych: Alert and oriented x3, normal affect and mood.        Assessment and Plan:   The following treatment plan was discussed    1. Acute non-recurrent maxillary sinusitis  Acute, new onset condition. Discussed " with likely viral process. Advised to take ibuprofen 600 mg as needed with food. Consider taking over-the-counter antihistamine with a nasal decongestant. Push fluids. Expect symptoms to improve. Follow up with any worsening symptoms such as continued fevers or shortness of breath or chest pain.      Followup: Return if symptoms worsen or fail to improve.    Please note that this dictation was created using voice recognition software. I have made every reasonable attempt to correct obvious errors, but I expect that there are errors of grammar and possibly content that I did not discover before finalizing the note.

## 2017-11-01 ENCOUNTER — NON-PROVIDER VISIT (OUTPATIENT)
Dept: URGENT CARE | Facility: PHYSICIAN GROUP | Age: 22
End: 2017-11-01

## 2017-11-01 DIAGNOSIS — Z11.1 PPD SCREENING TEST: ICD-10-CM

## 2017-11-01 PROCEDURE — 86580 TB INTRADERMAL TEST: CPT | Performed by: PHYSICIAN ASSISTANT

## 2017-11-01 NOTE — NON-PROVIDER
Nick Bourgeois is a 22 y.o. female here for a non-provider visit for PPD placement -- Step 1 of 1    Reason for PPD:  work requirement    1. TB evaluation questionnaire completed by patient? Yes      -  If any answers marked yes did you contact a provider prior to placing? Not Indicated  2.  Patient notified to return to clinic for reading on: 11/3-11/4 2017   3.  PPD Placement documentation completed on TB evaluation questionnaire? Yes  4.  Location of TB evaluation questionnaire filed:  file

## 2017-11-03 ENCOUNTER — NON-PROVIDER VISIT (OUTPATIENT)
Dept: URGENT CARE | Facility: PHYSICIAN GROUP | Age: 22
End: 2017-11-03

## 2017-11-03 LAB — TB WHEAL 3D P 5 TU DIAM: NORMAL MM

## 2017-11-07 DIAGNOSIS — Z01.812 PRE-OPERATIVE LABORATORY EXAMINATION: ICD-10-CM

## 2017-11-07 LAB
ABO GROUP BLD: NORMAL
ALBUMIN SERPL BCP-MCNC: 4.7 G/DL (ref 3.2–4.9)
ALBUMIN/GLOB SERPL: 1.5 G/DL
ALP SERPL-CCNC: 71 U/L (ref 30–99)
ALT SERPL-CCNC: 16 U/L (ref 2–50)
ANION GAP SERPL CALC-SCNC: 6 MMOL/L (ref 0–11.9)
APPEARANCE UR: CLEAR
AST SERPL-CCNC: 17 U/L (ref 12–45)
BACTERIA #/AREA URNS HPF: ABNORMAL /HPF
BASOPHILS # BLD AUTO: 0.6 % (ref 0–1.8)
BASOPHILS # BLD: 0.05 K/UL (ref 0–0.12)
BILIRUB SERPL-MCNC: 0.5 MG/DL (ref 0.1–1.5)
BILIRUB UR QL STRIP.AUTO: NEGATIVE
BLD GP AB SCN SERPL QL: NORMAL
BUN SERPL-MCNC: 11 MG/DL (ref 8–22)
CALCIUM SERPL-MCNC: 9.6 MG/DL (ref 8.5–10.5)
CHLORIDE SERPL-SCNC: 105 MMOL/L (ref 96–112)
CO2 SERPL-SCNC: 25 MMOL/L (ref 20–33)
COLOR UR: YELLOW
CREAT SERPL-MCNC: 0.57 MG/DL (ref 0.5–1.4)
CULTURE IF INDICATED INDCX: YES UA CULTURE
EOSINOPHIL # BLD AUTO: 0.17 K/UL (ref 0–0.51)
EOSINOPHIL NFR BLD: 2.1 % (ref 0–6.9)
EPI CELLS #/AREA URNS HPF: ABNORMAL /HPF
ERYTHROCYTE [DISTWIDTH] IN BLOOD BY AUTOMATED COUNT: 39.2 FL (ref 35.9–50)
GFR SERPL CREATININE-BSD FRML MDRD: >60 ML/MIN/1.73 M 2
GLOBULIN SER CALC-MCNC: 3.1 G/DL (ref 1.9–3.5)
GLUCOSE SERPL-MCNC: 77 MG/DL (ref 65–99)
GLUCOSE UR STRIP.AUTO-MCNC: NEGATIVE MG/DL
HCG SERPL QL: NEGATIVE
HCT VFR BLD AUTO: 45.9 % (ref 37–47)
HGB BLD-MCNC: 15.1 G/DL (ref 12–16)
HYALINE CASTS #/AREA URNS LPF: ABNORMAL /LPF
IMM GRANULOCYTES # BLD AUTO: 0.04 K/UL (ref 0–0.11)
IMM GRANULOCYTES NFR BLD AUTO: 0.5 % (ref 0–0.9)
KETONES UR STRIP.AUTO-MCNC: NEGATIVE MG/DL
LEUKOCYTE ESTERASE UR QL STRIP.AUTO: ABNORMAL
LYMPHOCYTES # BLD AUTO: 1.93 K/UL (ref 1–4.8)
LYMPHOCYTES NFR BLD: 23.9 % (ref 22–41)
MCH RBC QN AUTO: 28.8 PG (ref 27–33)
MCHC RBC AUTO-ENTMCNC: 32.9 G/DL (ref 33.6–35)
MCV RBC AUTO: 87.6 FL (ref 81.4–97.8)
MICRO URNS: ABNORMAL
MONOCYTES # BLD AUTO: 0.69 K/UL (ref 0–0.85)
MONOCYTES NFR BLD AUTO: 8.6 % (ref 0–13.4)
NEUTROPHILS # BLD AUTO: 5.19 K/UL (ref 2–7.15)
NEUTROPHILS NFR BLD: 64.3 % (ref 44–72)
NITRITE UR QL STRIP.AUTO: NEGATIVE
NRBC # BLD AUTO: 0 K/UL
NRBC BLD AUTO-RTO: 0 /100 WBC
PH UR STRIP.AUTO: 5.5 [PH]
PLATELET # BLD AUTO: 368 K/UL (ref 164–446)
PMV BLD AUTO: 10.1 FL (ref 9–12.9)
POTASSIUM SERPL-SCNC: 3.8 MMOL/L (ref 3.6–5.5)
PROT SERPL-MCNC: 7.8 G/DL (ref 6–8.2)
PROT UR QL STRIP: NEGATIVE MG/DL
RBC # BLD AUTO: 5.24 M/UL (ref 4.2–5.4)
RBC # URNS HPF: ABNORMAL /HPF
RBC UR QL AUTO: NEGATIVE
RH BLD: NORMAL
SODIUM SERPL-SCNC: 136 MMOL/L (ref 135–145)
SP GR UR STRIP.AUTO: 1.02
UROBILINOGEN UR STRIP.AUTO-MCNC: 0.2 MG/DL
WBC # BLD AUTO: 8.1 K/UL (ref 4.8–10.8)
WBC #/AREA URNS HPF: ABNORMAL /HPF

## 2017-11-07 PROCEDURE — 86900 BLOOD TYPING SEROLOGIC ABO: CPT

## 2017-11-07 PROCEDURE — 86850 RBC ANTIBODY SCREEN: CPT

## 2017-11-07 PROCEDURE — 87086 URINE CULTURE/COLONY COUNT: CPT

## 2017-11-07 PROCEDURE — 86901 BLOOD TYPING SEROLOGIC RH(D): CPT

## 2017-11-07 PROCEDURE — 80053 COMPREHEN METABOLIC PANEL: CPT

## 2017-11-07 PROCEDURE — 81001 URINALYSIS AUTO W/SCOPE: CPT

## 2017-11-07 PROCEDURE — 84703 CHORIONIC GONADOTROPIN ASSAY: CPT

## 2017-11-07 PROCEDURE — 36415 COLL VENOUS BLD VENIPUNCTURE: CPT

## 2017-11-07 PROCEDURE — 85025 COMPLETE CBC W/AUTO DIFF WBC: CPT

## 2017-11-09 LAB
BACTERIA UR CULT: NORMAL
SIGNIFICANT IND 70042: NORMAL
SITE SITE: NORMAL
SOURCE SOURCE: NORMAL

## 2017-11-10 ENCOUNTER — HOSPITAL ENCOUNTER (OUTPATIENT)
Facility: MEDICAL CENTER | Age: 22
End: 2017-11-10
Attending: OBSTETRICS & GYNECOLOGY | Admitting: OBSTETRICS & GYNECOLOGY
Payer: COMMERCIAL

## 2017-11-10 VITALS
SYSTOLIC BLOOD PRESSURE: 94 MMHG | DIASTOLIC BLOOD PRESSURE: 49 MMHG | OXYGEN SATURATION: 99 % | RESPIRATION RATE: 16 BRPM | TEMPERATURE: 97.4 F | WEIGHT: 144.18 LBS | BODY MASS INDEX: 24.02 KG/M2 | HEIGHT: 65 IN | HEART RATE: 68 BPM

## 2017-11-10 LAB
B-HCG FREE SERPL-ACNC: <5 MIU/ML
IHCGL IHCGL: NEGATIVE MIU/ML

## 2017-11-10 PROCEDURE — 160029 HCHG SURGERY MINUTES - 1ST 30 MINS LEVEL 4: Performed by: OBSTETRICS & GYNECOLOGY

## 2017-11-10 PROCEDURE — A9270 NON-COVERED ITEM OR SERVICE: HCPCS

## 2017-11-10 PROCEDURE — 160002 HCHG RECOVERY MINUTES (STAT): Performed by: OBSTETRICS & GYNECOLOGY

## 2017-11-10 PROCEDURE — 700111 HCHG RX REV CODE 636 W/ 250 OVERRIDE (IP)

## 2017-11-10 PROCEDURE — 160036 HCHG PACU - EA ADDL 30 MINS PHASE I: Performed by: OBSTETRICS & GYNECOLOGY

## 2017-11-10 PROCEDURE — 500886 HCHG PACK, LAPAROSCOPY: Performed by: OBSTETRICS & GYNECOLOGY

## 2017-11-10 PROCEDURE — 160041 HCHG SURGERY MINUTES - EA ADDL 1 MIN LEVEL 4: Performed by: OBSTETRICS & GYNECOLOGY

## 2017-11-10 PROCEDURE — 700101 HCHG RX REV CODE 250

## 2017-11-10 PROCEDURE — 160048 HCHG OR STATISTICAL LEVEL 1-5: Performed by: OBSTETRICS & GYNECOLOGY

## 2017-11-10 PROCEDURE — 700102 HCHG RX REV CODE 250 W/ 637 OVERRIDE(OP)

## 2017-11-10 PROCEDURE — 160035 HCHG PACU - 1ST 60 MINS PHASE I: Performed by: OBSTETRICS & GYNECOLOGY

## 2017-11-10 PROCEDURE — 160009 HCHG ANES TIME/MIN: Performed by: OBSTETRICS & GYNECOLOGY

## 2017-11-10 PROCEDURE — 501838 HCHG SUTURE GENERAL: Performed by: OBSTETRICS & GYNECOLOGY

## 2017-11-10 PROCEDURE — 84702 CHORIONIC GONADOTROPIN TEST: CPT

## 2017-11-10 PROCEDURE — 501582 HCHG TROCAR, THRD BLADED: Performed by: OBSTETRICS & GYNECOLOGY

## 2017-11-10 PROCEDURE — 500868 HCHG NEEDLE, SURGI(VARES): Performed by: OBSTETRICS & GYNECOLOGY

## 2017-11-10 RX ORDER — SODIUM CHLORIDE, SODIUM LACTATE, POTASSIUM CHLORIDE, CALCIUM CHLORIDE 600; 310; 30; 20 MG/100ML; MG/100ML; MG/100ML; MG/100ML
INJECTION, SOLUTION INTRAVENOUS CONTINUOUS
Status: DISCONTINUED | OUTPATIENT
Start: 2017-11-10 | End: 2017-11-10 | Stop reason: HOSPADM

## 2017-11-10 RX ORDER — OXYCODONE HCL 5 MG/5 ML
SOLUTION, ORAL ORAL
Status: DISCONTINUED
Start: 2017-11-10 | End: 2017-11-10 | Stop reason: HOSPADM

## 2017-11-10 RX ORDER — BUPIVACAINE HYDROCHLORIDE AND EPINEPHRINE 2.5; 5 MG/ML; UG/ML
INJECTION, SOLUTION EPIDURAL; INFILTRATION; INTRACAUDAL; PERINEURAL
Status: DISCONTINUED | OUTPATIENT
Start: 2017-11-10 | End: 2017-11-10 | Stop reason: HOSPADM

## 2017-11-10 RX ORDER — BUPIVACAINE HYDROCHLORIDE 2.5 MG/ML
INJECTION, SOLUTION EPIDURAL; INFILTRATION; INTRACAUDAL
Status: DISCONTINUED
Start: 2017-11-10 | End: 2017-11-10 | Stop reason: HOSPADM

## 2017-11-10 RX ORDER — MEPERIDINE HYDROCHLORIDE 25 MG/ML
INJECTION INTRAMUSCULAR; INTRAVENOUS; SUBCUTANEOUS
Status: COMPLETED
Start: 2017-11-10 | End: 2017-11-10

## 2017-11-10 RX ORDER — ALPRAZOLAM 0.25 MG/1
TABLET ORAL
Status: COMPLETED
Start: 2017-11-10 | End: 2017-11-10

## 2017-11-10 RX ORDER — EPINEPHRINE 1 MG/ML
INJECTION INTRAMUSCULAR; INTRAVENOUS; SUBCUTANEOUS
Status: DISCONTINUED
Start: 2017-11-10 | End: 2017-11-10 | Stop reason: HOSPADM

## 2017-11-10 RX ADMIN — SODIUM CHLORIDE, SODIUM LACTATE, POTASSIUM CHLORIDE, CALCIUM CHLORIDE: 600; 310; 30; 20 INJECTION, SOLUTION INTRAVENOUS at 11:30

## 2017-11-10 RX ADMIN — ALPRAZOLAM 0.25 MG: 0.25 TABLET ORAL at 10:45

## 2017-11-10 RX ADMIN — MEPERIDINE HYDROCHLORIDE 25 MG: 25 INJECTION INTRAMUSCULAR; INTRAVENOUS; SUBCUTANEOUS at 14:29

## 2017-11-10 ASSESSMENT — PAIN SCALES - GENERAL
PAINLEVEL_OUTOF10: 2
PAINLEVEL_OUTOF10: 0
PAINLEVEL_OUTOF10: 0
PAINLEVEL_OUTOF10: 3
PAINLEVEL_OUTOF10: 0
PAINLEVEL_OUTOF10: 2
PAINLEVEL_OUTOF10: 2
PAINLEVEL_OUTOF10: 0

## 2017-11-10 NOTE — OR NURSING
1500: care resumed, report rec'd from Fiona RN, pt resting,   1530: pt ready to get up to bathroom and attempt void, steady on feet, scant amount of vaginal bleeding present,   1535: pt successful at urinating, dressed, and placed in recliner, called family to come p/u  1550: pt doing well, iv d/c'd,   1625:  brought to bedside, d/c instructions discussed,   1634: pt taken out in wheelchair.

## 2017-11-10 NOTE — OR NURSING
1356: Rec'd pt from OR with Dr. Taylor, oral airway present, vss, no distress noted, breathing is even and unlabored, 1 lap stab present to umbilicus, covered with band-aid and cdi, hailey pad in place and no drainage seen at this time,   1407: airway removed,   1415: pt c/o 3/10 pain, given sips of water, tolerating well, medicated with oral pain meds  1425: Hand-off to Fiona MC.

## 2017-11-10 NOTE — OR NURSING
1429 Pt is shivering see mar.  RN placed bear paws on Pt for comfort.    1435 Pt is calm and eating otter pop.  No c/o n/v.      1501 Hand off care to Eugenia MC.

## 2017-11-10 NOTE — H&P
CHIEF COMPLAINT:  Preoperative history and physical.    HISTORY OF PRESENT ILLNESS:  The patient is a 22-year-old female,  1,   para 1, who desires to undergo permanent sterilization.    PAST MEDICAL HISTORY:  Significant for depression in the year .    PAST SURGICAL HISTORY:  Negative.    ALLERGIES:  Patient has no known drug allergies.    CURRENT MEDICATIONS:  None.    LABORATORY DATA:  Will be obtained today.    PHYSICAL EXAMINATION:  VITAL SIGNS:  Stable in the office.  CARDIOVASCULAR:  Regular rate and rhythm without murmurs.  LUNGS:  Clear.  ABDOMEN:  Nontender.  PELVIC:  Deferred.  EXTREMITIES:  Show no clubbing, cyanosis or edema.    ASSESSMENT AND PLAN:  This is a 22-year-old female,  1, para 1, who   desires to undergo permanent sterilization.  Plan is to proceed with a   laparoscopic bilateral tubal ligation.  I have discussed with her the risks of   the surgery to include pain, bleeding, infection, damage to internal organs,   anesthetic risks, HIV and hepatitis in the event that a transfusion is   necessary.  I have also explained to her that the procedure is permanent and   that we do not go back and perform a reversal procedure.  There is still a   small failure rate that could result in an ectopic pregnancy that could   require surgical removal.  The patient understands all of these risks factors.    Her questions were answered in the office.  She desires to proceed with a   laparoscopic bilateral tubal ligation.       ____________________________________     Corinne E. Capurro, MD [facility] / [facility]    DD:  11/10/2017 08:49:46  DT:  11/10/2017 09:03:39    D#:  [id number]  Job#:  [id number]

## 2017-11-10 NOTE — DISCHARGE INSTRUCTIONS
ACTIVITY: Rest and take it easy for the first 24 hours.  A responsible adult is recommended to remain with you during that time.  It is normal to feel sleepy.  We encourage you to not do anything that requires balance, judgment or coordination.    MILD FLU-LIKE SYMPTOMS ARE NORMAL. YOU MAY EXPERIENCE GENERALIZED MUSCLE ACHES, THROAT IRRITATION, HEADACHE AND/OR SOME NAUSEA.    FOR 24 HOURS DO NOT:  Drive, operate machinery or run household appliances.  Drink beer or alcoholic beverages.   Make important decisions or sign legal documents.    SPECIAL INSTRUCTIONS: *SEE POST OPERATIVE INSTRUCTION SHEET**    DIET: To avoid nausea, slowly advance diet as tolerated, avoiding spicy or greasy foods for the first day.  Add more substantial food to your diet according to your physician's instructions.  Babies can be fed formula or breast milk as soon as they are hungry.  INCREASE FLUIDS AND FIBER TO AVOID CONSTIPATION.    SURGICAL DRESSING/BATHING: *CHANGE AALIYAH PAD AS NEEDED, MAY REMOVE BAND-AID AND SHOWER IN 24 HOURS, NO TUB BATHS OR SWIMMING FOR 2 WEEKS**    FOLLOW-UP APPOINTMENT:  A follow-up appointment should be arranged with your doctor in *6 WEEKS**; call to schedule.    You should CALL YOUR PHYSICIAN if you develop:  Fever greater than 101 degrees F.  Pain not relieved by medication, or persistent nausea or vomiting.  Excessive bleeding (blood soaking through dressing) or unexpected drainage from the wound.  Extreme redness or swelling around the incision site, drainage of pus or foul smelling drainage.  Inability to urinate or empty your bladder within 8 hours.  Problems with breathing or chest pain.    You should call 911 if you develop problems with breathing or chest pain.  If you are unable to contact your doctor or surgical center, you should go to the nearest emergency room or urgent care center.  Physician's telephone #: **[phone]*    If any questions arise, call your doctor.  If your doctor is not  available, please feel free to call the Surgical Center at [phone].  The Center is open Monday through Friday from 7AM to 7PM.  You can also call the HEALTH HOTLINE open 24 hours/day, 7 days/week and speak to a nurse at [phone], or toll free at [phone].    A registered nurse may call you a few days after your surgery to see how you are doing after your procedure.    MEDICATIONS: Resume taking daily medication.  Take prescribed pain medication with food.  If no medication is prescribed, you may take non-aspirin pain medication if needed.  PAIN MEDICATION CAN BE VERY CONSTIPATING.  Take a stool softener or laxative such as senokot, pericolace, or milk of magnesia if needed.    Prescription given for *Percocet & Motrin**.  Last pain medication given at *2:15pm, next due at 6:15pm as needed**.    If your physician has prescribed pain medication that includes Acetaminophen (Tylenol), do not take additional Acetaminophen (Tylenol) while taking the prescribed medication.    Depression / Suicide Risk    As you are discharged from this [facility] facility, it is important to learn how to keep safe from harming yourself.    Recognize the warning signs:  · Abrupt changes in personality, positive or negative- including increase in energy   · Giving away possessions  · Change in eating patterns- significant weight changes-  positive or negative  · Change in sleeping patterns- unable to sleep or sleeping all the time   · Unwillingness or inability to communicate  · Depression  · Unusual sadness, discouragement and loneliness  · Talk of wanting to die  · Neglect of personal appearance   · Rebelliousness- reckless behavior  · Withdrawal from people/activities they love  · Confusion- inability to concentrate     If you or a loved one observes any of these behaviors or has concerns about self-harm, here's what you can do:  · Talk about it- your feelings and reasons for harming yourself  · Remove any means that  you might use to hurt yourself (examples: pills, rope, extension cords, firearm)  · Get professional help from the community (Mental Health, Substance Abuse, psychological counseling)  · Do not be alone:Call your Safe Contact- someone whom you trust who will be there for you.  · Call your local CRISIS HOTLINE 881-1650 or 614-933-8524  · Call your local Children's Mobile Crisis Response Team [place] (925) 348-8067 or www.[employer]  · Call the toll free National Suicide Prevention Hotlines   · National Suicide Prevention Lifeline 155-024-PTEK (6876)  · National Hope Line Network 800-SUICIDE (350-6613)

## 2017-11-14 NOTE — OP REPORT
DATE OF SERVICE:  11/10/2017    PREOPERATIVE DIAGNOSES:  Multiparous, desires permanent sterilization.    POSTOPERATIVE DIAGNOSES:  Multiparous, desires permanent sterilization.    PROCEDURE:  Laparoscopic bilateral tubal ligation by fulguration.    PRIMARY SURGEON:  Corinne Capurro, MD    ASSISTANT:  None.    COMPLICATIONS:  None.    PATHOLOGY:  None.    TOTAL ESTIMATED BLOOD LOSS:  Less than 20 mL.    FINDINGS:  A normal appearing uterus with normal bilateral fallopian tubes and   ovaries.    ANESTHESIA:  General endotracheal anesthesia.    PROCEDURE IN DETAIL:  After the patient was taken to the operating room and   her general anesthesia was found to be adequate, she was then prepped and   draped in the usual sterile fashion in dorsal supine position using Yovani   stirrups.  Attention was then turned to the patient's vagina.  A bivalve   speculum was placed into the patient's vagina.  Anterior lip of the cervix was   grasped with a single tooth tenaculum.  An acorn manipulator was introduced   into the cervix as a means to manipulate the uterus during the procedure.    Elrosa speculum was then removed.  Gloves were changed.    Attention was then turned to the patient's abdomen.  Marcaine with epinephrine   was injected infraumbilically.  Stab wound was made with a knife.  Veress   needle was then inserted while tenting the abdominal wall.  Confirmation made   with drop in syringe.  Insufflation was begun with CO2 gas.  Veress needle was   then removed.  Incision was extended with the knife.  A 10 mm trocar was then   introduced into the abdominal cavity while tenting the abdominal wall   uncomplicated.  Findings as noted above.  The patient's right fallopian tube   was then identified and carried out to the fimbriated end.  It was then   cauterized in 3 separate segments and hemostasis assured.  The same procedure   was performed on the patient's left fallopian tube.  Hemostasis assured.  All   instruments were  then removed from the patient's abdomen.  The fascia was   repaired in a single suture of 2-0 Vicryl.  The skin was closed in a running   fashion using 4-0 Vicryl and hemostasis assured.  This incision was then   covered with Steri-Strips and then a Band-Aid.    Attention was then turned back to the patient's vagina.  A single tooth   tenaculum was removed and silver nitrate was used to cauterize one of the   planes and hemostasis assured.  All instruments were then removed from the   patient's vagina.  The patient returned to recovery in stable condition.    Sponge, lap and needle counts were correct x3 at the end of procedure.       ____________________________________     Corinne E. Capurro, MD [facility] / [facility]    DD:  11/13/2017 15:57:59  DT:  11/13/2017 16:12:37    D#:  [id number]  Job#:  [id number]

## 2018-01-08 ENCOUNTER — OFFICE VISIT (OUTPATIENT)
Dept: MEDICAL GROUP | Facility: LAB | Age: 23
End: 2018-01-08
Payer: COMMERCIAL

## 2018-01-08 VITALS
DIASTOLIC BLOOD PRESSURE: 72 MMHG | RESPIRATION RATE: 12 BRPM | SYSTOLIC BLOOD PRESSURE: 110 MMHG | BODY MASS INDEX: 22.99 KG/M2 | OXYGEN SATURATION: 98 % | HEIGHT: 65 IN | HEART RATE: 87 BPM | TEMPERATURE: 98.4 F | WEIGHT: 138 LBS

## 2018-01-08 DIAGNOSIS — J01.00 ACUTE NON-RECURRENT MAXILLARY SINUSITIS: ICD-10-CM

## 2018-01-08 DIAGNOSIS — T36.95XA ANTIBIOTIC-INDUCED YEAST INFECTION: ICD-10-CM

## 2018-01-08 DIAGNOSIS — B37.9 ANTIBIOTIC-INDUCED YEAST INFECTION: ICD-10-CM

## 2018-01-08 DIAGNOSIS — R59.0 CERVICAL LYMPHADENOPATHY: ICD-10-CM

## 2018-01-08 PROCEDURE — 99214 OFFICE O/P EST MOD 30 MIN: CPT | Performed by: PHYSICIAN ASSISTANT

## 2018-01-08 RX ORDER — FLUCONAZOLE 100 MG/1
100 TABLET ORAL DAILY
Qty: 2 TAB | Refills: 0 | Status: SHIPPED | OUTPATIENT
Start: 2018-01-08 | End: 2018-08-06

## 2018-01-08 RX ORDER — AMOXICILLIN AND CLAVULANATE POTASSIUM 875; 125 MG/1; MG/1
1 TABLET, FILM COATED ORAL 2 TIMES DAILY
Qty: 20 TAB | Refills: 0 | Status: SHIPPED | OUTPATIENT
Start: 2018-01-08 | End: 2018-01-08 | Stop reason: SDUPTHER

## 2018-01-08 RX ORDER — AMOXICILLIN AND CLAVULANATE POTASSIUM 875; 125 MG/1; MG/1
1 TABLET, FILM COATED ORAL 2 TIMES DAILY
Qty: 20 TAB | Refills: 0 | Status: SHIPPED | OUTPATIENT
Start: 2018-01-08 | End: 2018-01-18

## 2018-01-08 RX ORDER — FLUCONAZOLE 100 MG/1
100 TABLET ORAL DAILY
Qty: 2 TAB | Refills: 0 | Status: SHIPPED | OUTPATIENT
Start: 2018-01-08 | End: 2018-01-08 | Stop reason: SDUPTHER

## 2018-01-08 RX ORDER — GUAIFENESIN 600 MG/1
600 TABLET, EXTENDED RELEASE ORAL EVERY 12 HOURS
COMMUNITY
End: 2018-08-06

## 2018-01-08 ASSESSMENT — PAIN SCALES - GENERAL: PAINLEVEL: NO PAIN

## 2018-01-08 NOTE — ASSESSMENT & PLAN NOTE
1 mo ago started having increase in mucus, coughing, and facial pressure and sinus headaches.   In the beginning had chills and cold sweats.   No one else has been sick, stated most everybody else got over the cold.   Has been treating with mucinex, dayquil and just started claritin 1 week ago.   Tried a nasal spray but she is not sure which one. She didn't not like it at all.     Also noticed during the same time maybe a couple days prior to getting sick she had a lump on her right neck. Non painful, not growing that she knows of. No redness.   Though she also lost 10 # in last 2 weeks.   Reviewing chart her weight is the same as wheat it was in 10/2017.   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

## 2018-01-08 NOTE — PROGRESS NOTES
"Subjective:     Chief Complaint   Patient presents with   • Mass     on neck, one month   • Cough   • Weight Loss     Hirena Valerie Bourgeois is a 22 y.o. female here today for sinus issues and lump on neck as listed below    Acute maxillary sinusitis  1 mo ago started having increase in mucus, coughing, and facial pressure and sinus headaches.   In the beginning had chills and cold sweats.   No one else has been sick, stated most everybody else got over the cold.   Has been treating with mucinex, dayquil and just started claritin 1 week ago.   Tried a nasal spray but she is not sure which one. She didn't not like it at all.     Also noticed during the same time maybe a couple days prior to getting sick she had a lump on her right neck. Non painful, not growing that she knows of. No redness.   Though she also lost 10 # in last 2 weeks.   Reviewing chart her weight is the same as wheat it was in 10/2017.   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      Current medicines (including changes today)  Current Outpatient Prescriptions   Medication Sig Dispense Refill   • guaifenesin LA (MUCINEX) 600 MG TABLET SR 12 HR Take 600 mg by mouth every 12 hours.     • Pseudoephedrine-APAP-DM (DAYQUIL PO) Take  by mouth.     • Loratadine (CLARITIN PO) Take  by mouth.     • amoxicillin-clavulanate (AUGMENTIN) 875-125 MG Tab Take 1 Tab by mouth 2 times a day for 10 days. 20 Tab 0   • fluconazole (DIFLUCAN) 100 MG Tab Take 1 Tab by mouth every day. 2 Tab 0     No current facility-administered medications for this visit.      She  has a past medical history of Ovarian cyst.    ROS   Denies Lightheadedness, ear pain or pressure, sore throat, shortness of breath, wheezing, chest pain, abdominal discomfort, nausea, vomiting, diarrhea, muscle aches, night sweats     Objective:     Blood pressure 110/72, pulse 87, temperature 36.9 °C (98.4 °F), resp. rate 12, height 1.651 m (5' 5\"), weight 62.6 kg (138 lb), last menstrual period 12/20/2017, SpO2 98 %, not currently " breastfeeding. Body mass index is 22.96 kg/m².   Physical Exam:  Alert, oriented in no acute distress.  Eye contact is good, speech goal directed, affect calm  HEENT: conjunctiva slightly injected, sclera non-icteric, PERRL.  Pinna normal. TM pearly gray. Nares patent, nasal terminates are erythematous and slightly edematous with thick mucus. + Tenderness palpation over right maxillary sinus  Oral mucous membranes erythematous injections, no exudates. Thick white to yellowish mucus noted posterior pharynx  Neck + adenopathy on right posterior cervicle, no tenderness to palpation, no erythema, edema, induration, no warmth. No supraclavicular, axillary lymphadenopathy noted.  Lungs: clear to auscultation bilaterally with good excursion. No wheezing, rhonchi, rales  CV: regular rate and rhythm.   Abdomen: soft, nontender, no HSM, No CVAT  Ext: no edema, color normal, peripheral pulses 2+, temperature normal        Assessment and Plan:   The following treatment plan was discussed     1. Acute non-recurrent maxillary sinusitis  We will treat with Augmentin, directions and side effects discussed in depth.  We also discussed the importance of clearing up the mucus. Patient will attempt to try nasal saline spray.   Continue with Mucinex and drinking lots of water, OTC cold medications, a also tried Zyrtec.   Warm compresses, hydration, rest.   - amoxicillin-clavulanate (AUGMENTIN) 875-125 MG Tab; Take 1 Tab by mouth 2 times a day for 10 days.  Dispense: 20 Tab; Refill: 0    2. Cervical lymphadenopathy  Since this developed during the same time as her cold, we will first treat her sinus infection with antibiotic which could also resolve this as well.  Follow-up in 2 weeks.   We did discuss her weight and that it is the same as October- this made patient feel better she does not believe she gained that much weight since October.     3. Antibiotic-induced yeast infection  Prophylactic treatment for Diflucan given.  Patient has  had this in the past, side effects discussed in depth anyways  - fluconazole (DIFLUCAN) 100 MG Tab; Take 1 Tab by mouth every day.  Dispense: 2 Tab; Refill: 0      Followup: Return 2-4 weeks, for lymph node.           Please note that this dictation was created using voice recognition software. I have made every reasonable attempt to correct obvious errors, but I expect that there are errors of grammar and possibly content that I did not discover before finalizing the note.

## 2018-03-08 ENCOUNTER — TELEPHONE (OUTPATIENT)
Dept: MEDICAL GROUP | Facility: LAB | Age: 23
End: 2018-03-08

## 2018-03-08 NOTE — TELEPHONE ENCOUNTER
Patient has been having pain in both feet for about a week. It is tingling pain which keeps her up at night and goes away when she stands up and moves around.

## 2018-03-13 ENCOUNTER — OFFICE VISIT (OUTPATIENT)
Dept: MEDICAL GROUP | Facility: LAB | Age: 23
End: 2018-03-13
Payer: COMMERCIAL

## 2018-03-13 VITALS
HEIGHT: 65 IN | DIASTOLIC BLOOD PRESSURE: 66 MMHG | WEIGHT: 139 LBS | TEMPERATURE: 98.3 F | BODY MASS INDEX: 23.16 KG/M2 | OXYGEN SATURATION: 96 % | HEART RATE: 74 BPM | RESPIRATION RATE: 12 BRPM | SYSTOLIC BLOOD PRESSURE: 98 MMHG

## 2018-03-13 DIAGNOSIS — R11.15 NON-INTRACTABLE CYCLICAL VOMITING WITH NAUSEA: ICD-10-CM

## 2018-03-13 PROCEDURE — 99213 OFFICE O/P EST LOW 20 MIN: CPT | Performed by: NURSE PRACTITIONER

## 2018-03-13 NOTE — PROGRESS NOTES
"Chief Complaint   Patient presents with   • Other     pt needs a work note releasing her to go back to work, she had vomiting        HPI  22-year-old established female here with request for return to work note. She experienced N/v for 24 hours last Wednesday, felt well the next day. Appetite is now good. Bowels are formed. He has not had any problems with nausea or vomiting in 4 days. Possible bad chicken the day before.  Also a .  Missed last Wed - today.  Not taking any medications for her symptoms. No history of the same. Denies any abdominal pain.    Past medical, surgical, family, and social history is reviewed and updated in Epic chart by me today.   Medications and allergies reviewed and updated in Epic chart by me today.     ROS:   As documented in history of present illness above    Exam:  Blood pressure (!) 98/66, pulse 74, temperature 36.8 °C (98.3 °F), resp. rate 12, height 1.651 m (5' 5\"), weight 63 kg (139 lb), SpO2 96 %.  Constitutional: Alert, no distress, plus 3 vital signs  Skin:  Warm, dry, no rashes invisible areas  Eye: Equal, round and reactive, conjunctiva clear  ENMT: Lips without lesions, good dentition, oropharynx clear    Neck: Trachea midline, no masses, no thyromegaly  Respiratory: Unlabored respiration  Cardiovascular: Normal rate and rhythm  Abdomen: Soft, nontender, no masses or hepatosplenomegaly  Psych: Alert, pleasant, well-groomed, normal affect    A/P:  1. Non-intractable cyclical vomiting with nausea  -Resolved x the past 4 days. Cleared to return to work full duty.    "

## 2018-03-13 NOTE — LETTER
March 13, 2018       Patient: Nick Bourgeois   YOB: 1995   Date of Visit: 3/13/2018         To Whom It May Concern:    It is my medical opinion that Nick Bourgeois return to full duty, no restrictions 3/13/2018.  She was seen and examined in our office today.    If you have any questions or concerns, please don't hesitate to call [phone]          Sincerely,          NATHALIE Mejia.  Electronically Signed

## 2018-05-10 ENCOUNTER — OFFICE VISIT (OUTPATIENT)
Dept: MEDICAL GROUP | Facility: LAB | Age: 23
End: 2018-05-10
Payer: COMMERCIAL

## 2018-05-10 VITALS
SYSTOLIC BLOOD PRESSURE: 94 MMHG | DIASTOLIC BLOOD PRESSURE: 68 MMHG | WEIGHT: 145 LBS | RESPIRATION RATE: 12 BRPM | HEIGHT: 65 IN | TEMPERATURE: 97.4 F | HEART RATE: 84 BPM | OXYGEN SATURATION: 95 % | BODY MASS INDEX: 24.16 KG/M2

## 2018-05-10 DIAGNOSIS — F41.8 DEPRESSION WITH ANXIETY: ICD-10-CM

## 2018-05-10 DIAGNOSIS — N92.0 MENORRHAGIA WITH REGULAR CYCLE: ICD-10-CM

## 2018-05-10 DIAGNOSIS — R59.0 ANTERIOR CERVICAL LYMPHADENOPATHY: ICD-10-CM

## 2018-05-10 PROCEDURE — 99214 OFFICE O/P EST MOD 30 MIN: CPT | Performed by: NURSE PRACTITIONER

## 2018-05-10 RX ORDER — BUSPIRONE HYDROCHLORIDE 10 MG/1
10 TABLET ORAL 2 TIMES DAILY
Qty: 60 TAB | Refills: 4 | Status: SHIPPED | OUTPATIENT
Start: 2018-05-10 | End: 2018-06-07

## 2018-05-10 NOTE — PROGRESS NOTES
Chief Complaint   Patient presents with   • Menstrual Problem     pt states she is having heavy menstrual cramping, heavy bleeding, she also states she has cramping througout the month, onset since she tubes tied 5 months ago    • Depression     pt also states she is having reoccurant issues with depression, since the birth of her child, getting worse   • Adenopathy     F/V on swollen lymph nodes, pt was given meds by PCP, but they have not helped       GELY Romero is a 22-year-old established female here with multiple issues:   #1-anxiety with depression: This is been an intermittent issue for her for the past several years and she feels is worsening. Returned from  2015 ( - started talk therapy which helped until pregnancy in 2016 - now feels all over the place emotionally.  Feels that everything is going fast within and around her.  Picking at fingers.  Trouble relaxing.  Trouble staying asleep.  Always tired. Sad easily. Feels much more anxious early morning and evening when she is around her kids.   Has 1.5 yr and 2.5 yr old, the 2-1/2-year-old is a stepchild.  Not breast feeding.  Kids with mom and pt is a  and happy there.    Had mild depression in high school - no tx.  Has not seen a therapist in over a year, did well with talk therapy in the past for depression but feels that she needs medicine for anxiety.  Has tubal ligation 11.2017 because of such a bad experience with her pregnancy.     #2-heavy and painful periods: This is a new issue for her since she had a tubal ligation. Has a normal moderate flow menses with minor cramping before tubal ligation. Now she has menstrual cramps throughout the month and heavy bleeding for 10 days.  OBGYN - Dr Aquino - tried OCP x 2 mo - no improvement- -she cannot recall what the birth control pill was. No relief with ibuprofen / midol the week before without improvement.  LMP is current - started on Monday.     #3-Swollen lymph nodes in her  "neck: She states this has been present since she was about 17 years old. At one point she was told that she should have her lymph nodes biopsied but this never occurred, she thinks that was about 4 years ago. She did go on antibiotics for a sinus infection in January and feels that the lymph nodes did not budge. She has never had a soft tissue ultrasound. She does not have trouble swallowing. Denies frequent illness. She did have a 10 pound weight loss in early winter but her weight has been stable ever since.    Past medical, surgical, family, and social history is reviewed and updated in Epic chart by me today.   Medications and allergies reviewed and updated in Epic chart by me today.     ROS:   As documented in history of present illness above    Exam:  Blood pressure (!) 94/68, pulse 84, temperature 36.3 °C (97.4 °F), resp. rate 12, height 1.651 m (5' 5\"), weight 65.8 kg (145 lb), SpO2 95 %.  Constitutional: Alert, no distress, plus 3 vital signs  Skin:  Warm, dry, no rashes invisible areas  Eye: Equal, round and reactive, conjunctiva clear  ENMT: Lips without lesions, good dentition, oropharynx clear    Neck: Trachea midline. I really do not appreciate any concerning enlarged anterior or posterior cervical lymphadenopathy. No supraclavicular lymph nodes appreciated.  Respiratory: Unlabored respiration, lungs clear to auscultation, no wheezes, no rhonchi  Cardiovascular: Normal rate and rhythm  Psych: Alert, pleasant, well-groomed, normal affect    A/P:  1. Depression with anxiety  -She was referred to restart therapy. She was started on BuSpar 10 mg twice a day for anxiety and will see her back here in 4 weeks. I encouraged her to start an exercise program. She denied any suicidal or homicidal ideations.  - REFERRAL TO BEHAVIORAL HEALTH  - busPIRone (BUSPAR) 10 MG Tab; Take 1 Tab by mouth 2 times a day.  Dispense: 60 Tab; Refill: 4    2. Anterior cervical lymphadenopathy  -Reviewed CBC that OB/GYN ordered in " the fall. Recommend ultrasound soft tissue of the head and neck.  - US-SOFT TISSUES OF HEAD - NECK; Future    3. Menorrhagia with regular cycle  -Recommend changing the hormone content of her birth control pill, we'll find out from her pharmacy what she is taking and change this, seeing her back in one month.  -Pt counseled on birth control pills. Risks, benefits and complications from hormone use reviewed.     Note attended, patient was on Sandra, she was changed to lo Loestrin if covered by her insurance.

## 2018-06-06 ENCOUNTER — TELEPHONE (OUTPATIENT)
Dept: MEDICAL GROUP | Facility: LAB | Age: 23
End: 2018-06-06

## 2018-06-06 NOTE — TELEPHONE ENCOUNTER
ESTABLISHED PATIENT PRE-VISIT PLANNING     Note: Patient will not be contacted if there is no indication to call.     1.  Reviewed notes from the last few office visits within the medical group: Yes    2.  If any orders were placed at last visit or intended to be done for this visit (i.e. 6 mos follow-up), do we have Results/Consult Notes?        •  Labs - Labs were not ordered at last office visit.       •  Imaging - Imaging ordered, NOT completed. Patient advised to complete prior to next appointment.       •  Referrals - Referral ordered, patient has NOT been seen.    3. Is this appointment scheduled as a Hospital Follow-Up? No    4.  Immunizations were updated in Epic using WebIZ?: No WebIZ record       •  Web Iz Recommendations:     5.  Patient is due for the following Health Maintenance Topics:   Health Maintenance Due   Topic Date Due   • IMM HEP B VACCINE (1 of 3 - Primary Series) 1995   • IMM HEP A VACCINE (1 of 2 - Standard Series) 10/30/1996   • IMM MENINGOCOCCAL B VACCINE (1 of 3 - Trumenba 3-Dose Series ) 10/30/2005   • IMM HPV VACCINE (1 of 3 - Female 3 Dose Series) 10/30/2006   • IMM VARICELLA (CHICKENPOX) VACCINE (1 of 2 - 2 Dose Adolescent Series) 10/30/2008   • IMM DTaP/Tdap/Td Vaccine (1 - Tdap) 10/30/2014   • PAP SMEAR  10/30/2016   • CHLAMYDIA SCREENING  04/05/2017           6.  MDX printed for Provider? NO    7.  Patient was NOT informed to arrive 15 min prior to their scheduled appointment and bring in their medication bottles.

## 2018-06-07 ENCOUNTER — OFFICE VISIT (OUTPATIENT)
Dept: MEDICAL GROUP | Facility: LAB | Age: 23
End: 2018-06-07
Payer: COMMERCIAL

## 2018-06-07 VITALS
HEART RATE: 68 BPM | SYSTOLIC BLOOD PRESSURE: 98 MMHG | RESPIRATION RATE: 12 BRPM | HEIGHT: 65 IN | BODY MASS INDEX: 23.66 KG/M2 | WEIGHT: 142 LBS | TEMPERATURE: 97.6 F | OXYGEN SATURATION: 95 % | DIASTOLIC BLOOD PRESSURE: 70 MMHG

## 2018-06-07 DIAGNOSIS — F41.8 DEPRESSION WITH ANXIETY: ICD-10-CM

## 2018-06-07 PROCEDURE — 99213 OFFICE O/P EST LOW 20 MIN: CPT | Performed by: NURSE PRACTITIONER

## 2018-06-07 RX ORDER — BUPROPION HYDROCHLORIDE 150 MG/1
150 TABLET ORAL EVERY MORNING
Qty: 30 TAB | Refills: 5 | Status: SHIPPED | OUTPATIENT
Start: 2018-06-07 | End: 2018-10-08

## 2018-06-07 RX ORDER — BUPROPION HYDROCHLORIDE 150 MG/1
150 TABLET ORAL EVERY MORNING
Qty: 30 TAB | Refills: 5 | Status: SHIPPED | OUTPATIENT
Start: 2018-06-07 | End: 2018-06-07

## 2018-06-07 NOTE — PROGRESS NOTES
"Chief Complaint   Patient presents with   • Depression     F/V on depression & anxiety, pt states meds are not helping        HPI  22-year-old established female here to follow-up on anxiety and depression.  She was started on BuSpar at her last visit as her symptoms seemed to center around anxiety.  Buspar made her feel spacey, tired and she had difficulty focusing.  Now feeling sad more than anything else.  She does feel overwhelmed, caring for her family.  She has a history of PTSD from her  experience.  She experiences a lot of different joint pains.  Occasional trouble focusing.  She is working full-time.  She has a lot of guilt based on going back to work.  She has not been able to see a therapist yet.  Denies suicidal or homicidal ideations.  She does sleep pretty well at night.  She is exercising periodically, not on a regular basis.  Her appetite is good.      Past medical, surgical, family, and social history is reviewed and updated in Epic chart by me today.   Medications and allergies reviewed and updated in Epic chart by me today.     ROS:   As documented in history of present illness above    Exam:  Blood pressure (!) 98/70, pulse 68, temperature 36.4 °C (97.6 °F), resp. rate 12, height 1.651 m (5' 5\"), weight 64.4 kg (142 lb), SpO2 95 %.  Constitutional: Alert, no distress, plus 3 vital signs  Skin:  Warm, dry, no rashes invisible areas  Psych: Alert, pleasant, well-groomed, normal affect    A/P:   #1-depression with anxiety: Trial of Wellbutrin.  We discussed Wellbutrin should help with her energy, focus and her depression.  Concern over whether Wellbutrin will affect her anxiety positively or negatively and this was discussed.  Discussed length of onset of efficacy with Wellbutrin.  She has discontinued BuSpar.  I did give her the number to set up an appointment with a therapist.  Encouraged her to exercise daily and avoid caffeine.  Recommended that she follow-up with me in 4 weeks.  "

## 2018-08-06 ENCOUNTER — HOSPITAL ENCOUNTER (OUTPATIENT)
Facility: MEDICAL CENTER | Age: 23
End: 2018-08-06
Attending: PHYSICIAN ASSISTANT
Payer: COMMERCIAL

## 2018-08-06 ENCOUNTER — OFFICE VISIT (OUTPATIENT)
Dept: MEDICAL GROUP | Facility: PHYSICIAN GROUP | Age: 23
End: 2018-08-06
Payer: COMMERCIAL

## 2018-08-06 VITALS
OXYGEN SATURATION: 100 % | TEMPERATURE: 97.4 F | DIASTOLIC BLOOD PRESSURE: 50 MMHG | HEART RATE: 92 BPM | HEIGHT: 65 IN | WEIGHT: 140 LBS | BODY MASS INDEX: 23.32 KG/M2 | SYSTOLIC BLOOD PRESSURE: 92 MMHG

## 2018-08-06 DIAGNOSIS — J02.9 ACUTE PHARYNGITIS, UNSPECIFIED ETIOLOGY: ICD-10-CM

## 2018-08-06 DIAGNOSIS — J02.9 SORE THROAT: ICD-10-CM

## 2018-08-06 LAB
INT CON NEG: NEGATIVE
INT CON POS: POSITIVE
S PYO AG THROAT QL: NORMAL

## 2018-08-06 PROCEDURE — 87081 CULTURE SCREEN ONLY: CPT

## 2018-08-06 PROCEDURE — 87077 CULTURE AEROBIC IDENTIFY: CPT

## 2018-08-06 PROCEDURE — 99213 OFFICE O/P EST LOW 20 MIN: CPT | Performed by: PHYSICIAN ASSISTANT

## 2018-08-06 PROCEDURE — 87880 STREP A ASSAY W/OPTIC: CPT | Performed by: PHYSICIAN ASSISTANT

## 2018-08-06 NOTE — PROGRESS NOTES
"Subjective:   Nick Bourgeois is a 22 y.o. female here today for sore throat. She is new to me, will be formally establishing care with a [facility] PCP at a later date.    HPI:    Patient presents to the office today with chief complaint of sore throat, onset yesterday evening. It has progressively worsened since that time. Associated with bilateral ear pain when swallowing which she thinks is related to the sore throat. No other symptoms. Specifically denies any fever, nasal congestion or runny nose, cough, nausea, or vomiting. Has had decreased appetite and decreased oral intake secondary to throat pain. She took one 600 mg dose of ibuprofen earlier.       Current medicines (including changes today)  Current Outpatient Prescriptions   Medication Sig Dispense Refill   • buPROPion (WELLBUTRIN XL) 150 MG XL tablet Take 1 Tab by mouth every morning. 30 Tab 5   • Norethin-Eth Estrad-Fe Biphas (LO LOESTRIN FE) 1 MG-10 MCG / 10 MCG Tab Take 1 Tab by mouth every day. 28 Tab 11     No current facility-administered medications for this visit.      She  has a past medical history of Ovarian cyst.    ROS  As per history of present illness.     Objective:     Blood pressure (!) 92/50, pulse 92, temperature 36.3 °C (97.4 °F), height 1.651 m (5' 5\"), weight 63.5 kg (140 lb), SpO2 100 %. Body mass index is 23.3 kg/m².     Physical Exam:  Constitutional: Alert, well-appearing, no distress.  Skin: Warm, dry, good turgor, no rashes in visible areas.  Eye: Pupils are equal and round, conjunctiva clear, lids normal.  ENMT: External ear canals are nonerythematous, noninflamed. Moderate amount of cerumen bilaterally. The right tympanic membrane is not fully visualized secondary to wax, but no obvious injection or bulging. Left tympanic membrane appears normal. Nasal turbinates are noninflamed, noninjected with no rhinorrhea visible. Lips without lesions, moist mucus membranes. Pharynx is injected and mildly inflamed. No visible " exudate. Uvula is midline, nondeviated.  Neck: Palpable enlarged right cervical lymph node. Bilaterally enlarged submandibular lymph nodes which are tender to palpation.   Respiratory: Unlabored respiratory effort, lungs clear to auscultation, no wheezes, no rhonchi.  Cardiovascular: Normal S1, S2, no murmur.      Assessment and Plan:   The following treatment plan was discussed    1. Acute pharyngitis, unspecified etiology  2. Sore throat  New problem. Exam consistent with acute pharyngitis. Rapid strep screen done in the office was negative, so suspect viral etiology. Will send for culture to confirm. In the meantime, patient was counseled on supportive treatments including continuation of ibuprofen to help with pain and swelling of the throat, warm saltwater gargles, and over-the-counter numbing medications like Cepacol or Chloraseptic. Will notify her of strep culture results when they come back and if positive, send treatment to her pharmacy.  - POCT Rapid Strep A  - BETA STREP SCREEN (GP. A); Future      Followup: Return if symptoms worsen or fail to improve.    Ivanna Soto P.A.-C.

## 2018-08-07 DIAGNOSIS — J02.9 SORE THROAT: ICD-10-CM

## 2018-08-08 ENCOUNTER — TELEPHONE (OUTPATIENT)
Dept: MEDICAL GROUP | Facility: PHYSICIAN GROUP | Age: 23
End: 2018-08-08

## 2018-08-08 DIAGNOSIS — J02.0 STREP PHARYNGITIS: ICD-10-CM

## 2018-08-08 RX ORDER — AMOXICILLIN 500 MG/1
500 CAPSULE ORAL 2 TIMES DAILY
Qty: 20 CAP | Refills: 0 | Status: SHIPPED | OUTPATIENT
Start: 2018-08-08 | End: 2018-08-18

## 2018-08-08 NOTE — TELEPHONE ENCOUNTER
1. Caller Name: Nick Bourgeois                                         Call Back Number: [phone]       Patient approves a detailed voicemail message: N\A    2. What are the patient's symptoms (location & severity)? Stiff Neck, Fatigue, Dizziness when sitting or standing    3. Is this a new symptom Yes    Patient wondering what this could be. Was notified that an appointment might be needed to discuss symptoms further.

## 2018-08-09 LAB
S PYO SPEC QL CULT: ABNORMAL
S PYO SPEC QL CULT: ABNORMAL
SIGNIFICANT IND 70042: ABNORMAL
SITE SITE: ABNORMAL
SOURCE SOURCE: ABNORMAL

## 2018-08-09 NOTE — TELEPHONE ENCOUNTER
Please inform patient that preliminary throat culture results show small growth of strep, so I am going to start her on antibiotics--amoxicillin 500 mg BID x 10 days. New symptoms may be related to her throat infection, but unable to properly evaluate without seeing her, so recommend appointment/urgent care if severe.  Ivanna Soto P.A.-C.

## 2018-10-08 ENCOUNTER — OFFICE VISIT (OUTPATIENT)
Dept: MEDICAL GROUP | Age: 23
End: 2018-10-08
Payer: COMMERCIAL

## 2018-10-08 VITALS
TEMPERATURE: 97.9 F | DIASTOLIC BLOOD PRESSURE: 64 MMHG | HEIGHT: 65 IN | OXYGEN SATURATION: 100 % | WEIGHT: 145.6 LBS | SYSTOLIC BLOOD PRESSURE: 110 MMHG | BODY MASS INDEX: 24.26 KG/M2 | HEART RATE: 68 BPM

## 2018-10-08 DIAGNOSIS — K12.0 APHTHOUS ULCER: ICD-10-CM

## 2018-10-08 DIAGNOSIS — L65.9 HAIR LOSS: ICD-10-CM

## 2018-10-08 DIAGNOSIS — L70.0 ACNE VULGARIS: ICD-10-CM

## 2018-10-08 DIAGNOSIS — R68.89 COLD INTOLERANCE: ICD-10-CM

## 2018-10-08 DIAGNOSIS — K59.01 SLOW TRANSIT CONSTIPATION: ICD-10-CM

## 2018-10-08 PROBLEM — J01.00 ACUTE MAXILLARY SINUSITIS: Status: RESOLVED | Noted: 2017-10-06 | Resolved: 2018-10-08

## 2018-10-08 PROCEDURE — 99215 OFFICE O/P EST HI 40 MIN: CPT | Performed by: INTERNAL MEDICINE

## 2018-10-08 RX ORDER — MINOCYCLINE HYDROCHLORIDE 50 MG/1
50 CAPSULE ORAL 2 TIMES DAILY
Qty: 28 CAP | Refills: 0 | Status: SHIPPED | OUTPATIENT
Start: 2018-10-08 | End: 2018-10-22

## 2018-10-08 RX ORDER — CLINDAMYCIN AND BENZOYL PEROXIDE 10; 50 MG/G; MG/G
GEL TOPICAL
Qty: 50 G | Refills: 0 | Status: SHIPPED | OUTPATIENT
Start: 2018-10-08 | End: 2019-11-20

## 2018-10-08 ASSESSMENT — PATIENT HEALTH QUESTIONNAIRE - PHQ9: CLINICAL INTERPRETATION OF PHQ2 SCORE: 0

## 2018-10-08 NOTE — ASSESSMENT & PLAN NOTE
Patient reported that she has canker sore in her oral cavity intermittently for the past 2 weeks.  She likes to eat deep fried food.  She also constipates most of the time.  She denies sore throat.

## 2018-10-08 NOTE — ASSESSMENT & PLAN NOTE
Patient reported that she has constipation chronically and she has bowel movement only 2 times a week.  She likes to eat more fried food or barbecue.  She has not tried any medication yet.

## 2018-10-08 NOTE — ASSESSMENT & PLAN NOTE
Patient reported that she has acne off and on for many years.  She has acne breaking out on her chin and face.  She tried over-the-counter acne facial wash and topical without improvement.  She denied allergy to any medication.  She wanted to try prescription medication for her acne.

## 2018-10-08 NOTE — ASSESSMENT & PLAN NOTE
Patient reported that she noticed hair loss in the past 4 months.  She denies patchy hair loss.  She also reported having cold intolerance and constipation or her life.  She never smoked and denied using recreational drugs.  She drinks alcohol 2-3 times per months.  She denied binge drinking.

## 2018-10-09 NOTE — PROGRESS NOTES
Nick Bourgeois is a 22 y.o. female here to establish care and the evaluation and management of:      HPI:    Acne vulgaris  Patient reported that she has acne off and on for many years.  She has acne breaking out on her chin and face.  She tried over-the-counter acne facial wash and topical without improvement.  She denied allergy to any medication.  She wanted to try prescription medication for her acne.    Aphthous ulcer  Patient reported that she has canker sore in her oral cavity intermittently for the past 2 weeks.  She likes to eat deep fried food.  She also constipates most of the time.  She denies sore throat.    Hair loss  Patient reported that she noticed hair loss in the past 4 months.  She denies patchy hair loss.  She also reported having cold intolerance and constipation or her life.  She never smoked and denied using recreational drugs.  She drinks alcohol 2-3 times per months.  She denied binge drinking.    Slow transit constipation  Patient reported that she has constipation chronically and she has bowel movement only 2 times a week.  She likes to eat more fried food or barbecue.  She has not tried any medication yet.    Current medicines (including changes today)  Current Outpatient Prescriptions   Medication Sig Dispense Refill   • minocycline (MINOCIN) 50 MG Cap Take 1 Cap by mouth 2 times a day for 14 days. 28 Cap 0   • clindamycin-benzoyl peroxide (BENZACLIN) gel Apply once a day at night on affected skin for 4-6 weeks. Advise to stop if you have skin irritation. 50 g 0     No current facility-administered medications for this visit.      She  has a past medical history of Ovarian cyst.  She  has a past surgical history that includes tympanotomy and tubal coagulation laparoscopic bilateral (Bilateral, 11/10/2017).  Social History   Substance Use Topics   • Smoking status: Never Smoker   • Smokeless tobacco: Never Used   • Alcohol use 0.0 oz/week      Comment: 3 per month     Social History  "    Social History Narrative   • No narrative on file     Family History   Problem Relation Age of Onset   • Cancer Mother 35        ovarian cancer   • Cancer Maternal Grandmother         breast cancer at age 37 and cervical cancer at age 44   • Diabetes Maternal Grandmother    • Hyperlipidemia Maternal Grandmother      Family Status   Relation Status   • Mo Alive   • Fa Alive   • MGMo Alive   • MGFa Alive   • PGMo Alive   • PGFa Alive     Health Maintenance Topics with due status: Overdue       Topic Date Due    IMM HPV VACCINE 10/30/2006    IMM VARICELLA (CHICKENPOX) VACCINE 10/30/2008    IMM DTaP/Tdap/Td Vaccine 10/30/2014    PAP SMEAR 10/30/2016    CHLAMYDIA SCREENING 04/05/2017    IMM INFLUENZA 09/01/2018         ROS    Gen.: Denied weight change, appetite change, fatigue.  ENT: Denied sinus tenderness, nasal congestion, runny nose, or sore throat  CVS: Denied chest pain, palpitations, legs swelling.  Respiratory: Denied cough, shortness of breath, wheezing.  GI: Reported constipation chronically.  Denied abdominal pain, or diarrhea.  Endocrine: Reported cold intolerance, denied increased frequency of urination, polyphagia or polydipsia.  Musculoskeletal: Denied back pain or joint pain.    All other systems reviewed and are negative     Objective:     Blood pressure 110/64, pulse 68, temperature 36.6 °C (97.9 °F), temperature source Temporal, height 1.638 m (5' 4.5\"), weight 66 kg (145 lb 9.6 oz), last menstrual period 09/17/2018, SpO2 100 %, not currently breastfeeding. Body mass index is 24.61 kg/m².  Physical Exam:    Constitutional: Well nourished and Well developed, Alert, no distress.  Skin: Warm, dry, good turgor, no rashes in visible areas.  Eye: Equal, round and reactive, conjunctiva clear, lids normal.  ENMT: Lips without lesions, good dentition, oropharynx clear.  Neck: Trachea midline, no masses, no thyromegaly. No cervical or supraclavicular lymphadenopathy.  Respiratory: Unlabored respiratory " effort, lungs clear to auscultation, no wheezes, no ronchi.  Cardiovascular: Normal S1, S2, no murmur, no edema.   Abdomen: Soft, non distended, non-tender, no masses. Bowel sound normal.  Extremities: No edema, no clubbing, no cyanosis.  Psych: Alert and oriented x3, normal affect and mood.          Assessment and Plan:   The following treatment plan was discussed       1. Acne vulgaris  - Prescribed minocycline to take 1 capsule twice a day for 14 days and prescribed BenzaClin gel to apply once a day at night on affected skin for 4-6 weeks.  Reviewed potential side effects of minocycline and BenzaClin gel with patient.  Advised patient to use using the gel if she has sensitive or irritation on her skin or dry skin.  Patient is advised to take minocycline with plenty of water and keep upright after taking antibiotic to prevent acid reflux or pill induced esophagitis.  Patient is also advised to take probiotic with minocycline to prevent diarrhea or vaginal yeast infection.  - We also discussed to avoid dairy and processed sugar.  - minocycline (MINOCIN) 50 MG Cap; Take 1 Cap by mouth 2 times a day for 14 days.  Dispense: 28 Cap; Refill: 0  - clindamycin-benzoyl peroxide (BENZACLIN) gel; Apply once a day at night on affected skin for 4-6 weeks. Advise to stop if you have skin irritation.  Dispense: 50 g; Refill: 0  - CBC WITH DIFFERENTIAL; Future  - COMP METABOLIC PANEL; Future    2. Hair loss  - Discussed possible causes of hair loss with patient.  We discussed balanced diet with nutrition and regular physical exercise.  Will check CBC CMP and thyroid function test.  - CBC WITH DIFFERENTIAL; Future  - COMP METABOLIC PANEL; Future  - TSH; Future  - FREE THYROXINE; Future    3. Cold intolerance  - We discussed balanced nutrition and regular physical exercise.  We will check thyroid function test.  - TSH; Future  - FREE THYROXINE; Future    4. Slow transit constipation  - Patient is advised to increase water intake  and fiber intake.  Patient is advised to try over-the-counter stool softener or fiber supplements.  Advised patient to avoid dairy currently.    5. Aphthous ulcer  - Discussed with patient to avoid eating spicy food, deep fried and barbecue.  Patient is advised to avoid alcohol.  Patient is advised to eat more fibers and increase water intake.  Patient is advised to to gargle with warm salt water 2-3 times a day.  Patient is advised to try vitamin B complex and vitamin C supplements daily.    Face-to-face time spent 40 minutes with patient and more than half of that time spent for counseling for risks and benefits of medication treatment for acne, reviewed potential side effects of minocycline and BenzaClin gel, counseling for balanced nutrition and regular physical exercise, counseling for eating more fiber and avoid some of the food that can trigger aphthous ulcer, obtaining past medical history, surgical history and family history as well as cooperating of care for medical problems listed above.       Followup: Return in about 3 months (around 1/8/2019), or if symptoms worsen or fail to improve, for Acne, hair loss, cold intolerance, constipation, Lab review. sooner should new symptoms or problems arise.      Please note that this dictation was created using voice recognition software. I have made every reasonable attempt to correct obvious errors, but I expect that there may have unintended errors in text, spelling, punctuation, or grammar that I did not discover.

## 2018-10-26 ENCOUNTER — HOSPITAL ENCOUNTER (OUTPATIENT)
Dept: LAB | Facility: MEDICAL CENTER | Age: 23
End: 2018-10-26
Attending: INTERNAL MEDICINE
Payer: COMMERCIAL

## 2018-10-26 DIAGNOSIS — R68.89 COLD INTOLERANCE: ICD-10-CM

## 2018-10-26 DIAGNOSIS — L70.0 ACNE VULGARIS: ICD-10-CM

## 2018-10-26 DIAGNOSIS — L65.9 HAIR LOSS: ICD-10-CM

## 2018-10-26 LAB
ALBUMIN SERPL BCP-MCNC: 4.8 G/DL (ref 3.2–4.9)
ALBUMIN/GLOB SERPL: 1.5 G/DL
ALP SERPL-CCNC: 64 U/L (ref 30–99)
ALT SERPL-CCNC: 15 U/L (ref 2–50)
ANION GAP SERPL CALC-SCNC: 5 MMOL/L (ref 0–11.9)
AST SERPL-CCNC: 15 U/L (ref 12–45)
BASOPHILS # BLD AUTO: 1.2 % (ref 0–1.8)
BASOPHILS # BLD: 0.07 K/UL (ref 0–0.12)
BILIRUB SERPL-MCNC: 0.6 MG/DL (ref 0.1–1.5)
BUN SERPL-MCNC: 13 MG/DL (ref 8–22)
CALCIUM SERPL-MCNC: 10 MG/DL (ref 8.5–10.5)
CHLORIDE SERPL-SCNC: 106 MMOL/L (ref 96–112)
CO2 SERPL-SCNC: 27 MMOL/L (ref 20–33)
CREAT SERPL-MCNC: 0.83 MG/DL (ref 0.5–1.4)
EOSINOPHIL # BLD AUTO: 0.26 K/UL (ref 0–0.51)
EOSINOPHIL NFR BLD: 4.6 % (ref 0–6.9)
ERYTHROCYTE [DISTWIDTH] IN BLOOD BY AUTOMATED COUNT: 39.6 FL (ref 35.9–50)
GLOBULIN SER CALC-MCNC: 3.1 G/DL (ref 1.9–3.5)
GLUCOSE SERPL-MCNC: 90 MG/DL (ref 65–99)
HCT VFR BLD AUTO: 47.2 % (ref 37–47)
HGB BLD-MCNC: 15.4 G/DL (ref 12–16)
IMM GRANULOCYTES # BLD AUTO: 0.01 K/UL (ref 0–0.11)
IMM GRANULOCYTES NFR BLD AUTO: 0.2 % (ref 0–0.9)
LYMPHOCYTES # BLD AUTO: 2.03 K/UL (ref 1–4.8)
LYMPHOCYTES NFR BLD: 35.6 % (ref 22–41)
MCH RBC QN AUTO: 28.4 PG (ref 27–33)
MCHC RBC AUTO-ENTMCNC: 32.6 G/DL (ref 33.6–35)
MCV RBC AUTO: 86.9 FL (ref 81.4–97.8)
MONOCYTES # BLD AUTO: 0.47 K/UL (ref 0–0.85)
MONOCYTES NFR BLD AUTO: 8.2 % (ref 0–13.4)
NEUTROPHILS # BLD AUTO: 2.87 K/UL (ref 2–7.15)
NEUTROPHILS NFR BLD: 50.2 % (ref 44–72)
NRBC # BLD AUTO: 0 K/UL
NRBC BLD-RTO: 0 /100 WBC
PLATELET # BLD AUTO: 336 K/UL (ref 164–446)
PMV BLD AUTO: 10.2 FL (ref 9–12.9)
POTASSIUM SERPL-SCNC: 3.9 MMOL/L (ref 3.6–5.5)
PROT SERPL-MCNC: 7.9 G/DL (ref 6–8.2)
RBC # BLD AUTO: 5.43 M/UL (ref 4.2–5.4)
SODIUM SERPL-SCNC: 138 MMOL/L (ref 135–145)
T4 FREE SERPL-MCNC: 1.11 NG/DL (ref 0.53–1.43)
TSH SERPL DL<=0.005 MIU/L-ACNC: 1.23 UIU/ML (ref 0.38–5.33)
WBC # BLD AUTO: 5.7 K/UL (ref 4.8–10.8)

## 2018-10-26 PROCEDURE — 84439 ASSAY OF FREE THYROXINE: CPT

## 2018-10-26 PROCEDURE — 85025 COMPLETE CBC W/AUTO DIFF WBC: CPT

## 2018-10-26 PROCEDURE — 80053 COMPREHEN METABOLIC PANEL: CPT

## 2018-10-26 PROCEDURE — 84443 ASSAY THYROID STIM HORMONE: CPT

## 2018-10-26 PROCEDURE — 36415 COLL VENOUS BLD VENIPUNCTURE: CPT

## 2018-11-06 ENCOUNTER — OFFICE VISIT (OUTPATIENT)
Dept: MEDICAL GROUP | Age: 23
End: 2018-11-06
Payer: COMMERCIAL

## 2018-11-06 ENCOUNTER — HOSPITAL ENCOUNTER (OUTPATIENT)
Facility: MEDICAL CENTER | Age: 23
End: 2018-11-06
Attending: INTERNAL MEDICINE
Payer: COMMERCIAL

## 2018-11-06 VITALS
OXYGEN SATURATION: 96 % | HEART RATE: 73 BPM | TEMPERATURE: 98.8 F | SYSTOLIC BLOOD PRESSURE: 104 MMHG | DIASTOLIC BLOOD PRESSURE: 62 MMHG | BODY MASS INDEX: 24.53 KG/M2 | HEIGHT: 65 IN | WEIGHT: 147.2 LBS

## 2018-11-06 DIAGNOSIS — Z23 NEED FOR VACCINATION: ICD-10-CM

## 2018-11-06 DIAGNOSIS — Z11.3 SCREEN FOR STD (SEXUALLY TRANSMITTED DISEASE): ICD-10-CM

## 2018-11-06 DIAGNOSIS — Z01.419 ENCOUNTER FOR GYNECOLOGICAL EXAMINATION: ICD-10-CM

## 2018-11-06 DIAGNOSIS — Z12.4 SCREENING FOR CERVICAL CANCER: ICD-10-CM

## 2018-11-06 PROCEDURE — 87591 N.GONORRHOEAE DNA AMP PROB: CPT | Mod: 91

## 2018-11-06 PROCEDURE — 99395 PREV VISIT EST AGE 18-39: CPT | Mod: 25 | Performed by: INTERNAL MEDICINE

## 2018-11-06 PROCEDURE — 87591 N.GONORRHOEAE DNA AMP PROB: CPT

## 2018-11-06 PROCEDURE — 88175 CYTOPATH C/V AUTO FLUID REDO: CPT

## 2018-11-06 PROCEDURE — 90471 IMMUNIZATION ADMIN: CPT | Performed by: INTERNAL MEDICINE

## 2018-11-06 PROCEDURE — 90686 IIV4 VACC NO PRSV 0.5 ML IM: CPT | Performed by: INTERNAL MEDICINE

## 2018-11-06 PROCEDURE — 87491 CHLMYD TRACH DNA AMP PROBE: CPT

## 2018-11-06 PROCEDURE — 87491 CHLMYD TRACH DNA AMP PROBE: CPT | Mod: 91

## 2018-11-06 NOTE — PROGRESS NOTES
SUBJECTIVE: 23 y.o. female for annual routine gynecologic exam  Chief Complaint   Patient presents with   • Gynecologic Exam     PAP       Obstetric History       T0      L0     SAB0   TAB0   Ectopic0   Molar0   Multiple0   Live Births0       Last Pap: none  History   Sexual Activity   • Sexual activity: Yes   • Partners: Male   • Birth control/ protection: Surgical     Comment: tubal ligation     Sexual history: currently sexually active, single partner, history of chlamydia   H/O Abnormal Pap no  She  reports that she has never smoked. She has never used smokeless tobacco.        Allergies: Patient has no known allergies.     ROS:    Reports none menopause symptoms of hot flashes, night sweats, sleep disruption, mood changes.Denies vaginal dryness.   Menses every month with 5-7 days heavy bleeding.  Cramping is mild.   She does not take OTC analgesics for cramping  No significant bloating/fluid retention, pelvic pain, or dyspareunia. No vaginal discharge   No breast tenderness, mass, nipple discharge, changes in size or contour, or abnormal cyclic discomfort.  No urinary tract symptoms, no incontinence.   No abdominal pain, change in bowel habits, black or bloody stools.    No unusual headaches, no visual changes, menstrual migraines   No prolonged cough. No dyspnea or chest pain on exertion.  No depression, labile mood, anxiety, libido changes, insomnia.  No polydipsia, polyuria, temperature intolerance.  No new/concerning skin lesions, concerns.     Exercise: minimal exercise  Preventive Care: Patient reported that she received HPV vaccine 3 series in her teenage.  She stated that she also has chickenpox infection in her childhood.  She received Tdap vaccine in  on 14.    Current medicines (including changes today)  Current Outpatient Prescriptions   Medication Sig Dispense Refill   • clindamycin-benzoyl peroxide (BENZACLIN) gel Apply once a day at night on affected skin for 4-6 weeks.  "Advise to stop if you have skin irritation. 50 g 0     No current facility-administered medications for this visit.      She  has a past medical history of Ovarian cyst.  She  has a past surgical history that includes tympanotomy and tubal coagulation laparoscopic bilateral (Bilateral, 11/10/2017).     Family History:   Family History   Problem Relation Age of Onset   • Cancer Mother 35        ovarian cancer   • Cancer Maternal Grandmother         breast cancer at age 37 and cervical cancer at age 44   • Diabetes Maternal Grandmother    • Hyperlipidemia Maternal Grandmother           OBJECTIVE:   /62 (BP Location: Right arm, Patient Position: Sitting, BP Cuff Size: Adult)   Pulse 73   Temp 37.1 °C (98.8 °F) (Temporal)   Ht 1.638 m (5' 4.5\")   Wt 66.8 kg (147 lb 3.2 oz)   LMP 10/10/2018 (Approximate)   SpO2 96%   Breastfeeding? No   BMI 24.88 kg/m²   Body mass index is 24.88 kg/m².    HEAD AND NECK:  Ears normal.  Throat, oral cavity and tongue normal.  Neck supple. No adenopathy or masses in the neck or supraclavicular regions.  No carotid bruits. No thyromegaly. NEURO: Cranial nerves are normal. DTR's normal and symmetric.  CHEST:  Clear, good air entry, no wheezes or rales. HEART:  Regular rate and rhythm.  S1 and S2 normal. No edema or JVD. ABDOMEN:  Soft without tenderness, guarding, mass or organomegaly.  No CVA tenderness or inguinal adenopathy. EXTREMITIES:  Extremities, reflexes and peripheral pulses are normal. SKIN: color normal, vascularity normal, no edema, temperature normal   No rashes or suspicious skin lesions noted.     Breast Exam: Performed with instruction during examination. No axillary lymphadenopathy, no skin changes, no dominant masses. No nipple retraction  Pelvic Exam -  Normal external genitalia with no lesions. Normal vaginal mucosa with normal rugation and thin, grey discharge. Cervix with no visible lesions. No cervical motion tenderness. Uterus is normal sized with no " masses. No adnexal tenderness or enlargement appreciated. Thin Prep Pap is obtained, vaginal swab is obtained and specimen(s) sent to lab  Rectal: deferred    <ASSESSMENT and PLAN>  1. Encounter for gynecological examination      Counseling for healthy diet and regular physical exercise and age appropriate immunization and screening.  Advised to have self breast exam once a month.  Patient is advised to take multivitamins with iron during her menstrual cycle due to heavy menstruation last for 5-7 days.  Patient is advised to keep well-hydrated with water and is advised to eat more fiber to prevent constipation.     2. Screening for cervical cancer  THINPREP RFLX HPV ASCUS W/CTNG    Obtained Pap smear and sent to lab.  Will advise for result.     3. Screen for STD (sexually transmitted disease)  CHLAMYDIA/GC PCR URINE OR SWAB    Counseling for safe sex practice and compliance with Condom to prevent STD.  Patient reported that she was treated for chlamydia in her high school. She also stated that she has history of pelvic inflammatory disease and her symptom resolved after antibiotic treatment.  She stated that she has 1 pregnancy and 1 daughter born in 2016.  She already had tubal ligation.  She denied dyspareunia or irregular spotting or bleeding after sexual intercourse.     4. Need for vaccination  Influenza Vaccine Quad Injection >3Y (PF)    Influenza vaccine was given today after reviewing risks and benefits as well as side effects of vaccine.     5       I reviewed her recent blood test result with her in clinic today.  She has slightly high hematocrit at 47.2           And slightly high RBC at 5.43.  Her hemoglobin is normal and other cell counts are normal.           CMP and thyroid function test on 10/26/2018 are normal.    Discussed  breast self exam, STD prevention, diet and exercise   Follow-up in 1 years for next Gyn exam and 3 years for next Pap if her Pap smear result comes back normal.   Next office  visit for recheck of chronic medical conditions is due in 1 year.

## 2018-11-07 DIAGNOSIS — Z12.4 SCREENING FOR CERVICAL CANCER: ICD-10-CM

## 2018-11-07 LAB
C TRACH DNA SPEC QL NAA+PROBE: NEGATIVE
N GONORRHOEA DNA SPEC QL NAA+PROBE: NEGATIVE
SPECIMEN SOURCE: NORMAL

## 2018-11-08 LAB
C TRACH DNA GENITAL QL NAA+PROBE: NEGATIVE
CYTOLOGY REG CYTOL: NORMAL
N GONORRHOEA DNA GENITAL QL NAA+PROBE: NEGATIVE
SPECIMEN SOURCE: NORMAL

## 2019-10-09 ENCOUNTER — OFFICE VISIT (OUTPATIENT)
Dept: URGENT CARE | Facility: CLINIC | Age: 24
End: 2019-10-09
Payer: COMMERCIAL

## 2019-10-09 VITALS
TEMPERATURE: 98.6 F | BODY MASS INDEX: 27.38 KG/M2 | DIASTOLIC BLOOD PRESSURE: 64 MMHG | SYSTOLIC BLOOD PRESSURE: 112 MMHG | HEART RATE: 80 BPM | OXYGEN SATURATION: 96 % | WEIGHT: 162 LBS | RESPIRATION RATE: 16 BRPM

## 2019-10-09 DIAGNOSIS — J01.00 ACUTE NON-RECURRENT MAXILLARY SINUSITIS: ICD-10-CM

## 2019-10-09 PROCEDURE — 99214 OFFICE O/P EST MOD 30 MIN: CPT | Performed by: PHYSICIAN ASSISTANT

## 2019-10-09 RX ORDER — AMOXICILLIN AND CLAVULANATE POTASSIUM 875; 125 MG/1; MG/1
1 TABLET, FILM COATED ORAL 2 TIMES DAILY
Qty: 14 TAB | Refills: 0 | Status: SHIPPED | OUTPATIENT
Start: 2019-10-09 | End: 2019-10-16

## 2019-10-09 ASSESSMENT — ENCOUNTER SYMPTOMS
EYE REDNESS: 0
WHEEZING: 0
FEVER: 0
DIZZINESS: 1
EYE DISCHARGE: 0
SHORTNESS OF BREATH: 0
CHILLS: 0
EYE PAIN: 0
SINUS PRESSURE: 1
SINUS PAIN: 1
HEADACHES: 1
SORE THROAT: 0
COUGH: 0
PALPITATIONS: 0

## 2019-10-09 NOTE — LETTER
October 9, 2019         Patient: Nick Jaimes   YOB: 1995   Date of Visit: 10/9/2019           To Whom it May Concern:    Nick Jaimes was seen in my clinic on 10/9/2019.  Please excuse her from work on this day.  If you have any questions or concerns, please don't hesitate to call.        Sincerely,           Franco Givens P.A.-C.  Electronically Signed

## 2019-10-09 NOTE — PROGRESS NOTES
Subjective:      Nick Jaimes is a 23 y.o. female who presents with Nasal Congestion (x 3 weeks and stes blood comes out when blowing her nose and ear drainage started coming out of (R) ear this morning)            Sinusitis   This is a new problem. The current episode started 1 to 4 weeks ago. The problem is unchanged. Associated symptoms include congestion, ear pain, headaches and sinus pressure. Pertinent negatives include no chills, coughing, shortness of breath or sore throat. Past treatments include oral decongestants, saline sprays and spray decongestants. The treatment provided no relief.       Review of Systems   Constitutional: Positive for malaise/fatigue. Negative for chills and fever.   HENT: Positive for congestion, ear pain, sinus pressure and sinus pain. Negative for sore throat.    Eyes: Negative for pain, discharge and redness.   Respiratory: Negative for cough, shortness of breath and wheezing.    Cardiovascular: Negative for chest pain and palpitations.   Neurological: Positive for dizziness and headaches.   All other systems reviewed and are negative.    PMH:  has a past medical history of Ovarian cyst.  MEDS:   Current Outpatient Medications:   •  amoxicillin-clavulanate (AUGMENTIN) 875-125 MG Tab, Take 1 Tab by mouth 2 times a day for 7 days., Disp: 14 Tab, Rfl: 0  •  clindamycin-benzoyl peroxide (BENZACLIN) gel, Apply once a day at night on affected skin for 4-6 weeks. Advise to stop if you have skin irritation. (Patient not taking: Reported on 10/9/2019), Disp: 50 g, Rfl: 0  ALLERGIES: No Known Allergies  SURGHX:   Past Surgical History:   Procedure Laterality Date   • TUBAL COAGULATION LAPAROSCOPIC BILATERAL Bilateral 11/10/2017    Procedure: TUBAL COAGULATION LAPAROSCOPIC BILATERAL;  Surgeon: Corinne E Capurro, M.D.;  Location: SURGERY SAME [facility];  Service: Gynecology   • TYMPANOTOMY      age 4yo     SOCHX:  reports that she has never smoked. She has never used  smokeless tobacco. She reports that she drinks alcohol. She reports that she does not use drugs.  FH: Family history was reviewed, no pertinent findings to report  Medications, Allergies, and current problem list reviewed today in Epic       Objective:     Blood Pressure 112/64 (BP Location: Right arm, Patient Position: Sitting, BP Cuff Size: Adult)   Pulse 80   Temperature 37 °C (98.6 °F) (Temporal)   Respiration 16   Weight 73.5 kg (162 lb)   Oxygen Saturation 96%   Body Mass Index 27.38 kg/m²      Physical Exam   Constitutional: She is oriented to person, place, and time. She appears well-developed and well-nourished.  Non-toxic appearance. She does not have a sickly appearance. She does not appear ill. No distress.   HENT:   Head: Normocephalic and atraumatic.   Right Ear: Hearing, tympanic membrane, external ear and ear canal normal.   Left Ear: Hearing, tympanic membrane, external ear and ear canal normal.   Nose: Mucosal edema and rhinorrhea present. No sinus tenderness. No epistaxis. Right sinus exhibits maxillary sinus tenderness. Left sinus exhibits maxillary sinus tenderness.   Mouth/Throat: Uvula is midline, oropharynx is clear and moist and mucous membranes are normal.   Eyes: Conjunctivae and EOM are normal.   Neck: Normal range of motion. Neck supple.   Cardiovascular: Normal rate, regular rhythm, normal heart sounds and intact distal pulses.   Pulmonary/Chest: Effort normal and breath sounds normal.   Neurological: She is alert and oriented to person, place, and time.   Skin: Skin is warm and dry.   Psychiatric: She has a normal mood and affect. Her behavior is normal. Judgment and thought content normal.   Vitals reviewed.              Assessment/Plan:     1. Acute non-recurrent maxillary sinusitis    - amoxicillin-clavulanate (AUGMENTIN) 875-125 MG Tab; Take 1 Tab by mouth 2 times a day for 7 days.  Dispense: 14 Tab; Refill: 0    Differential diagnosis, natural history, supportive care  discussed. Follow-up with primary care provider within 7-10 days, emergency room precautions discussed.  Patient and/or family appears understanding of information.  Handout and review of patients diagnosis and treatment was discussed extensively.

## 2019-10-30 ENCOUNTER — NON-PROVIDER VISIT (OUTPATIENT)
Dept: URGENT CARE | Facility: PHYSICIAN GROUP | Age: 24
End: 2019-10-30

## 2019-10-30 DIAGNOSIS — Z11.1 PPD SCREENING TEST: ICD-10-CM

## 2019-10-30 PROCEDURE — 86580 TB INTRADERMAL TEST: CPT | Performed by: PHYSICIAN ASSISTANT

## 2019-10-31 NOTE — NON-PROVIDER
Nick Jaimes is a 24 y.o. female here for a non-provider visit for PPD placement -- Step 1 of 1    Reason for PPD:  work requirement    1. TB evaluation questionnaire completed by patient? Yes      -  If any answers marked yes did you contact a provider prior to placing? Not Indicated  2.  Patient notified to return to clinic for reading on: 11/01/19 after 12:40 or 11/02/19 before 12:40  3.  PPD Placement documentation completed on TB evaluation questionnaire? Yes  4.  Location of TB evaluation questionnaire filed:  file

## 2019-11-02 ENCOUNTER — APPOINTMENT (OUTPATIENT)
Dept: URGENT CARE | Facility: PHYSICIAN GROUP | Age: 24
End: 2019-11-02

## 2019-11-02 LAB — TB WHEAL 3D P 5 TU DIAM: NORMAL MM

## 2019-11-19 ENCOUNTER — APPOINTMENT (OUTPATIENT)
Dept: MEDICAL GROUP | Age: 24
End: 2019-11-19
Payer: COMMERCIAL

## 2019-11-20 ENCOUNTER — OFFICE VISIT (OUTPATIENT)
Dept: MEDICAL GROUP | Age: 24
End: 2019-11-20
Payer: COMMERCIAL

## 2019-11-20 VITALS
WEIGHT: 164.6 LBS | TEMPERATURE: 98.7 F | DIASTOLIC BLOOD PRESSURE: 66 MMHG | HEART RATE: 71 BPM | BODY MASS INDEX: 27.42 KG/M2 | OXYGEN SATURATION: 97 % | HEIGHT: 65 IN | SYSTOLIC BLOOD PRESSURE: 110 MMHG

## 2019-11-20 DIAGNOSIS — M26.623 BILATERAL TEMPOROMANDIBULAR JOINT PAIN: ICD-10-CM

## 2019-11-20 PROCEDURE — 99214 OFFICE O/P EST MOD 30 MIN: CPT | Performed by: INTERNAL MEDICINE

## 2019-11-20 RX ORDER — IBUPROFEN 600 MG/1
600 TABLET ORAL EVERY 8 HOURS PRN
Qty: 30 TAB | Refills: 0 | Status: SHIPPED | OUTPATIENT
Start: 2019-11-20 | End: 2020-06-10

## 2019-11-20 SDOH — HEALTH STABILITY: MENTAL HEALTH: HOW OFTEN DO YOU HAVE A DRINK CONTAINING ALCOHOL?: 2-4 TIMES A MONTH

## 2019-11-20 SDOH — HEALTH STABILITY: MENTAL HEALTH: HOW OFTEN DO YOU HAVE 6 OR MORE DRINKS ON ONE OCCASION?: MONTHLY

## 2019-11-20 SDOH — HEALTH STABILITY: MENTAL HEALTH: HOW MANY STANDARD DRINKS CONTAINING ALCOHOL DO YOU HAVE ON A TYPICAL DAY?: 5 OR 6

## 2019-11-20 ASSESSMENT — PATIENT HEALTH QUESTIONNAIRE - PHQ9
5. POOR APPETITE OR OVEREATING: 3 - NEARLY EVERY DAY
CLINICAL INTERPRETATION OF PHQ2 SCORE: 2
SUM OF ALL RESPONSES TO PHQ QUESTIONS 1-9: 12

## 2019-11-20 ASSESSMENT — PAIN SCALES - GENERAL: PAINLEVEL: 7=MODERATE-SEVERE PAIN

## 2019-11-20 NOTE — PROGRESS NOTES
"Subjective:   Nick Jaimes is a 24 y.o. female here today for evaluation and management of:    Bilateral temporomandibular joint pain  Acute. Patient reports 4 days ago she was eating when \"all of a sudden her jaw popped\". She's now unable to \"touch her molars together in the back and it's like if I try to close my mouth all the way it's like my jaw is going to break\". She reports a pressure-like pain right worse than left, with popping sensation bilaterally when she talks, chews, or moves her jaw. At home she's tried low-dose ibuprofen without relief. She denies a history of TMJ. She believes she grinds her teeth at night however denies use of . She reports 8 years ago she got elbowed in the jaw while cheerleading, however did not require treatment at that time. She's never had spontaneous onset of symptoms like current episode. She was last seen by her dentist in 2015. She is not pregnant, not breastfeeding, and denies current medications.  She denies abdominal pain or history of stomach bleeding or history of gastritis. She had normal kidney function 10/26/18.    Current medicines (including changes today)  Current Outpatient Medications   Medication Sig Dispense Refill   • ibuprofen (MOTRIN) 600 MG Tab Take 1 Tab by mouth every 8 hours as needed for Moderate Pain or Inflammation. Take with meal and stop if stomach pain or blood in stool. 30 Tab 0     No current facility-administered medications for this visit.      She  has a past medical history of Ovarian cyst.    ROS   No chest pain, no shortness of breath, no abdominal pain  Asserts jaw pain     Objective:     /66 (BP Location: Right arm, Patient Position: Sitting, BP Cuff Size: Adult)   Pulse 71   Temp 37.1 °C (98.7 °F) (Temporal)   Ht 1.657 m (5' 5.24\")   Wt 74.7 kg (164 lb 9.6 oz)   SpO2 97%  Body mass index is 27.19 kg/m².   Physical Exam:  General: Alert, oriented and no acute distress.  Eye contact is good, speech goal " "directed, affect calm  HEENT: conjunctiva non-injected, sclera non-icteric.  Oral mucous membranes pink and moist with no lesions.  No dental caries, no gum swelling on exam. Tender on palpation of the bilateral TMJ.  Pinna normal.   Lungs: Normal respiratory effort, clear to auscultation bilaterally with good excursion.  CV: regular rate and rhythm. No murmurs.   Abdomen: soft, non distended, nontender, Bowel sound normal.  Ext: no edema, color normal, vascularity normal, temperature normal  Musculoskeletal exam: moving all extremities freely.      Assessment and Plan:   The following treatment plan was discussed:    1. Bilateral temporomandibular joint pain  - Patient reports jaw \"popped\" 4 days ago and she now has pressure-like pain with movement of jaw.   - At this time I have initiated her on ibuprofen 600 mg Q8H PRN. I reviewed potential risks, benefits, and side effects of this medication with the patient in clinic today. She should take this medication with food. She is advised to discontinue this medication if she develops abdominal pain, melena, or hematochezia.  - I have referred patient to Dr. Tillman (Dentist at [facility]). She is additionally advised to follow up with her dentist.   - Patient is advised to inquire about a soft dental guard from Dr. Pina or her dentist, in order to prevent teeth grinding at night.  - Patient is advised to avoid hard, sticky foods, hydrate with water, and apply ice to affected areas.  - Patient is advised on urgent return precautions including fever, chills, nausea, vomiting, abdominal pain or blood in stool.   - ibuprofen (MOTRIN) 600 MG Tab; Take 1 Tab by mouth every 8 hours as needed for Moderate Pain or Inflammation. Take with meal and stop if stomach pain or blood in stool.  Dispense: 30 Tab; Refill: 0  - REFERRAL TO OTHER    2. Health Maintenance   - Patient has elected to postpone flu vaccine after discussion of potential risks, " benefits, and side effects.  - Patient is due for annual chlamydia screening which she elects to postpone at this time. She tested negative for chlamydia in 2018. She denies acute symptoms/concerns.  She states that she is  and has only one sexual partner, her .  She is advised to seek urgent medical attention if she has any genitalia lesion or vaginal discharge.  She has normal Pap smear on 11/6/2018.    Follow up: Return if symptoms worsen or fail to improve.    I, Kerry Mendez (Fabricioibe), am scribing for, and in the presence of, Desirae Cooper M.D.. Electronically signed by: Kerry Mendez (Lisa), 11/20/2019.  ?  I, Desirae Cooper M.D., personally performed the services described in this documentation, as scribed by Kerry Mendez in my presence, and it is both accurate and complete.    Please note that this dictation was created using voice recognition software. I have made every reasonable attempt to correct obvious errors, but I expect that there may have unintended errors in text, spelling, punctuation, or grammar that I did not discover.

## 2019-11-20 NOTE — PATIENT INSTRUCTIONS
Jaw Range of Motion Exercises  Introduction  Jaw range of motion exercises are exercises that help your jaw to move better. These exercises can help to prevent:  · Difficulty opening your mouth.  · Pain in your jaw while it is both open and closed.  What should I be careful of when doing jaw exercises?  Make sure that you only do jaw exercises as directed by your health care provider. You should only move your jaw as far as it can go in each direction, if told to do so by your health care provider. Do not move your jaw into positions that cause you any pain.  What exercises should I do?  · Stick your jaw forward. Hold this position for 1-2 seconds. Allow your jaw to return to its normal position and rest it there for 1-2 seconds. Do this exercise 8 times.  · Stand or sit in front of a mirror. Place your tongue on the roof of your mouth, just behind your top teeth. Slowly open and close your jaw, keeping your tongue on the roof of your mouth. While you open and close your mouth, try to keep your jaw from moving toward one side or the other. Repeat this 8 times.  · Move your jaw right. Hold this position for 1-2 seconds. Allow your jaw to return to its normal position, and rest it there for 1-2 seconds. Do this exercise 8 times.  · Move your jaw left. Hold this position for 1-2 seconds. Allow your jaw to return to its normal position, and rest it there for 1-2 seconds. Do this exercise 8 times.  · Open your mouth as far as it is can comfortably go. Hold this position for 1-2 seconds. Then close your mouth and rest for 1-2 seconds. Do this exercise 8 times.  · Move your jaw in a circular motion, starting toward the right (clockwise). Repeat this 8 times.  · Move your jaw in a circular motion, starting toward the left (counterclockwise). Repeat this 8 times.  Apply moist heat packs or ice packs to your jaw before or after performing your exercises as directed by your health care provider.  What else can I do?  Avoid the  following, if they cause jaw pain or they increase your jaw pain:  · Chewing gum.  · Clenching your jaw or teeth or keeping tension in your jaw muscles.  · Leaning on your jaw, such as resting your jaw in your hand while leaning on a desk.  This information is not intended to replace advice given to you by your health care provider. Make sure you discuss any questions you have with your health care provider.  Document Released: 11/30/2009 Document Revised: 05/25/2017 Document Reviewed: 11/18/2015  © 2017 [employer]

## 2020-01-05 ENCOUNTER — HOSPITAL ENCOUNTER (EMERGENCY)
Facility: MEDICAL CENTER | Age: 25
End: 2020-01-05
Attending: EMERGENCY MEDICINE
Payer: COMMERCIAL

## 2020-01-05 VITALS
RESPIRATION RATE: 18 BRPM | WEIGHT: 164.02 LBS | DIASTOLIC BLOOD PRESSURE: 78 MMHG | SYSTOLIC BLOOD PRESSURE: 117 MMHG | HEIGHT: 70 IN | OXYGEN SATURATION: 100 % | TEMPERATURE: 97.2 F | HEART RATE: 81 BPM | BODY MASS INDEX: 23.48 KG/M2

## 2020-01-05 DIAGNOSIS — J02.9 EXUDATIVE PHARYNGITIS: ICD-10-CM

## 2020-01-05 LAB — S PYO DNA SPEC NAA+PROBE: DETECTED

## 2020-01-05 PROCEDURE — 87077 CULTURE AEROBIC IDENTIFY: CPT

## 2020-01-05 PROCEDURE — 99284 EMERGENCY DEPT VISIT MOD MDM: CPT

## 2020-01-05 PROCEDURE — 87651 STREP A DNA AMP PROBE: CPT

## 2020-01-05 PROCEDURE — 87070 CULTURE OTHR SPECIMN AEROBIC: CPT

## 2020-01-05 RX ORDER — CEFDINIR 300 MG/1
300 CAPSULE ORAL 2 TIMES DAILY
Qty: 20 CAP | Refills: 0 | Status: SHIPPED
Start: 2020-01-05 | End: 2020-03-23

## 2020-01-05 RX ORDER — METHYLPREDNISOLONE 4 MG/1
TABLET ORAL
Qty: 1 PACKAGE | Refills: 0 | Status: SHIPPED | OUTPATIENT
Start: 2020-01-05 | End: 2020-03-23

## 2020-01-05 RX ORDER — CEFDINIR 300 MG/1
300 CAPSULE ORAL 2 TIMES DAILY
Qty: 20 CAP | Refills: 0 | Status: SHIPPED | OUTPATIENT
Start: 2020-01-05 | End: 2020-03-23

## 2020-01-05 NOTE — ED PROVIDER NOTES
ED Provider Note    CHIEF COMPLAINT  Chief Complaint   Patient presents with   • Sore Throat   • Headache       HPI  Nick Jaimes is a 24 y.o. female who presents to the emergency department with a chief complaint of sore throat headache and abdominal pain.  The symptoms started yesterday.  She denies any known exposure but does have a daughter that has a sore throat as well who is in .  She has had strep in the past but states this feels worse and more painful.  She is able to open her mouth and swallow and denies any other acute symptoms    REVIEW OF SYSTEMS  Positive for sore throat headache abdominal pain, Negative for neck stiffness vomiting  PAST MEDICAL HISTORY   has a past medical history of Ovarian cyst.    SOCIAL HISTORY  Social History     Tobacco Use   • Smoking status: Never Smoker   • Smokeless tobacco: Never Used   Substance and Sexual Activity   • Alcohol use: Yes     Alcohol/week: 0.0 oz     Frequency: 2-4 times a month     Drinks per session: 5 or 6     Binge frequency: Monthly     Comment: 3 per month   • Drug use: No   • Sexual activity: Yes     Partners: Male     Birth control/protection: Surgical     Comment: tubal ligation       SURGICAL HISTORY   has a past surgical history that includes tympanotomy and tubal coagulation laparoscopic bilateral (Bilateral, 11/10/2017).    CURRENT MEDICATIONS  Reviewed.  See Encounter Summary.  Include No current facility-administered medications for this encounter.     Current Outpatient Medications:   •  cefdinir (OMNICEF) 300 MG Cap, Take 1 Cap by mouth 2 times a day., Disp: 20 Cap, Rfl: 0  •  methylPREDNISolone (MEDROL DOSEPAK) 4 MG Tablet Therapy Pack, Use as directed, Disp: 1 Package, Rfl: 0  •  cefdinir (OMNICEF) 300 MG Cap, Take 1 Cap by mouth 2 times a day., Disp: 20 Cap, Rfl: 0  •  ibuprofen (MOTRIN) 600 MG Tab, Take 1 Tab by mouth every 8 hours as needed for Moderate Pain or Inflammation. Take with meal and stop if stomach pain or  "blood in stool., Disp: 30 Tab, Rfl: 0      ALLERGIES  No Known Allergies    PHYSICAL EXAM  VITAL SIGNS: /77   Pulse (!) 106   Temp 36.2 °C (97.2 °F) (Temporal)   Resp 18   Ht 1.778 m (5' 10\")   Wt 74.4 kg (164 lb 0.4 oz)   LMP 12/27/2019   SpO2 98%   BMI 23.53 kg/m²   Constitutional: Pleasant, Alert in no apparent distress.  HENT: Normocephalic, Bilateral external ears normal. Nose normal.  Uvula midline bilateral exudative tonsils noted handling his secretions well  Eyes: Pupils are equal and reactive. Conjunctiva normal, non-icteric.   Thorax & Lungs: Easy unlabored respirations  Abdomen:  No gross signs of peritonitis, no pain with movement   Skin: Visualized skin is  Dry, No erythema, No rash.   Extremities:   No edema, No asymmetry  Neurologic: Alert, Grossly non-focal.   Psychiatric: Affect and Mood normal      COURSE & MEDICAL DECISION MAKING  Nursing notes and vital signs were reviewed. (See chart for details)    The patient presents to the Emergency Department with what appears to be acute tonsillitis on exam rapid strep was performed in the emergency department which is positive.  Secondary to the tonsillar swelling she will be put on a Medrol Dosepak as well as Omnicef.  She is to follow-up with her primary care physician if symptoms do not improve and to return to the ER if she has difficulty swallowing opening her mouth or any worsening symptoms        DISPOSITION:  Patient will be discharged home in stable condition.    FOLLOW UP:  No follow-up provider specified.    OUTPATIENT MEDICATIONS:  New Prescriptions    CEFDINIR (OMNICEF) 300 MG CAP    Take 1 Cap by mouth 2 times a day.    CEFDINIR (OMNICEF) 300 MG CAP    Take 1 Cap by mouth 2 times a day.    METHYLPREDNISOLONE (MEDROL DOSEPAK) 4 MG TABLET THERAPY PACK    Use as directed             The patient was discharged home with an information sheet on tonsillitis and told to return immediately for any signs or symptoms listed, but " specifically if difficulty swallowing, or any worsening at all.  The patient verbally agreed to the discharge precautions and follow-up plan which is documented in EPIC.    FINAL IMPRESSION  1. Tonsillitis    Electronically signed by: Vidya Olivera, 1/5/2020 12:34 PM

## 2020-01-05 NOTE — ED NOTES
Stan from Lab called with critical result of Positive strep A at 1227. Critical lab result read back to Stan.   Dr. Olivera notified of critical lab result at 1229.  Critical lab result read back by Dr. Olivera.

## 2020-01-05 NOTE — ED TRIAGE NOTES
Nick Jaimes presents to triage reporting sore throat, fatigue, and HA since last night. Pt states she noted white patches in back of throat-- none noted in triage. No cough noted.     Pt speaking in full sentences, NAD noted. Pt educated of triage process, placed back in waiting area pending room assignment.

## 2020-01-07 LAB
BACTERIA SPEC RESP CULT: ABNORMAL
BACTERIA SPEC RESP CULT: ABNORMAL
SIGNIFICANT IND 70042: ABNORMAL
SITE SITE: ABNORMAL
SOURCE SOURCE: ABNORMAL

## 2020-01-07 NOTE — ED NOTES
"ED Positive Culture Follow-up/Notification Note:    Date: 1/7/2020     Patient seen in the ED on 1/5/2020 for sore throat, fatigue, and headache. Per chart review group a strep positive and physical exam revealed tonsillar swelling. Patient was prescribed cefdinir 10 day course and medrol dose pack.    1. Exudative pharyngitis       Discharge Medication List as of 1/5/2020 12:34 PM      START taking these medications    Details   !! cefdinir (OMNICEF) 300 MG Cap Take 1 Cap by mouth 2 times a day., Disp-20 Cap, R-0, Normal      methylPREDNISolone (MEDROL DOSEPAK) 4 MG Tablet Therapy Pack Use as directed, Disp-1 Package, R-0, Print Rx Paper      !! cefdinir (OMNICEF) 300 MG Cap Take 1 Cap by mouth 2 times a day., Disp-20 Cap, R-0, Print Rx Paper       !! - Potential duplicate medications found. Please discuss with provider.          Allergies: Patient has no known allergies.     Vitals:    01/05/20 1049 01/05/20 1052 01/05/20 1241   BP: 135/77  117/78   Pulse: (!) 106  81   Resp: 18  18   Temp: 36.2 °C (97.2 °F)     TempSrc: Temporal     SpO2: 98%  100%   Weight:  74.4 kg (164 lb 0.4 oz)    Height: 1.778 m (5' 10\")         Final cultures:   Results     Procedure Component Value Units Date/Time    CULTURE THROAT [[id number]]  (Abnormal) Collected:  01/05/20 1116    Order Status:  Completed Specimen:  Throat Updated:  01/07/20 1037     Significant Indicator POS     Source THRT     Site THROAT     Culture Result Moderate growth usual upper respiratory mary      Beta Hemolytic Streptococcus group A  Light growth      Narrative:       CALL  Jimenes  ER tel. ,  CALLED  ER tel.  01/06/2020, 13:02, RB PERF. RESULTS CALLED TO:[id number] LM    Group A Strep by PCR [[id number]]  (Abnormal) Collected:  01/05/20 1116    Order Status:  Completed Specimen:  Throat Updated:  01/05/20 1224     Group A Strep by PCR DETECTED          Plan:   Throat culture result positive for light growth of GAS and moderate growth of usually upper respiratory " mary. Cefdinir covers GAS and was also prescribed for tonsillitis  Appropriate antibiotic therapy prescribed. No changes required based upon culture result.  Sent letter to patient to notify of positive culture result and encourage compliance with prescribed antibiotics.     Kristen Orantes, Pharm.D., BCPS

## 2020-03-09 ENCOUNTER — TELEPHONE (OUTPATIENT)
Dept: HEALTH INFORMATION MANAGEMENT | Facility: OTHER | Age: 25
End: 2020-03-09

## 2020-03-09 NOTE — TELEPHONE ENCOUNTER
1. Caller Name:Nick Jaimes                   Call Back Number: [phone]  [facility] PCP or Specialty Provider: Yes Kos        2.  Does patient have any active symptoms of respiratory illness (fever OR cough OR shortness of breath)? Yes, the patient reports the following respiratory symptoms: fever of 100.4°F (38°C) or greater. 102.0 fever this morning. Took tylenol, sore throat, cough since Saturday. Fatigue.     3.  In the last 30 days, has the patient traveled outside of the country OR in a high risk area within the  OR have any known contact with someone who has?  No.    4.  Does patient have any comoribidities? None     5. Disposition:  Advised to perform self care, monitor for worsening symptoms and to call back in 3 days if no improvement.    Note routed to PCP: FYI only.     Pt may call for work excuse note if employer requests one.

## 2020-03-23 ENCOUNTER — OFFICE VISIT (OUTPATIENT)
Dept: MEDICAL GROUP | Age: 25
End: 2020-03-23
Payer: COMMERCIAL

## 2020-03-23 VITALS
SYSTOLIC BLOOD PRESSURE: 118 MMHG | WEIGHT: 165 LBS | TEMPERATURE: 97.5 F | DIASTOLIC BLOOD PRESSURE: 82 MMHG | HEART RATE: 87 BPM | HEIGHT: 65 IN | BODY MASS INDEX: 27.49 KG/M2 | OXYGEN SATURATION: 100 %

## 2020-03-23 DIAGNOSIS — E55.9 HYPOVITAMINOSIS D: ICD-10-CM

## 2020-03-23 DIAGNOSIS — F41.1 GAD (GENERALIZED ANXIETY DISORDER): ICD-10-CM

## 2020-03-23 DIAGNOSIS — G47.9 SLEEP DISORDER: ICD-10-CM

## 2020-03-23 DIAGNOSIS — F33.1 RECURRENT MAJOR DEPRESSIVE EPISODES, MODERATE (HCC): ICD-10-CM

## 2020-03-23 DIAGNOSIS — Z13.1 SCREENING FOR DIABETES MELLITUS: ICD-10-CM

## 2020-03-23 DIAGNOSIS — Z13.6 SCREENING FOR CARDIOVASCULAR CONDITION: ICD-10-CM

## 2020-03-23 DIAGNOSIS — R53.83 FATIGUE, UNSPECIFIED TYPE: ICD-10-CM

## 2020-03-23 PROCEDURE — 99214 OFFICE O/P EST MOD 30 MIN: CPT | Performed by: INTERNAL MEDICINE

## 2020-03-23 RX ORDER — ALPRAZOLAM 0.25 MG/1
0.25 TABLET ORAL NIGHTLY PRN
Qty: 30 TAB | Refills: 1 | Status: SHIPPED | OUTPATIENT
Start: 2020-03-23 | End: 2020-03-23

## 2020-03-23 RX ORDER — FLUOXETINE 10 MG/1
10 CAPSULE ORAL DAILY
Qty: 90 CAP | Refills: 0 | Status: SHIPPED | OUTPATIENT
Start: 2020-03-23 | End: 2020-06-10 | Stop reason: SDUPTHER

## 2020-03-23 RX ORDER — ALPRAZOLAM 0.25 MG/1
0.25 TABLET ORAL NIGHTLY PRN
Qty: 30 TAB | Refills: 1 | Status: SHIPPED | OUTPATIENT
Start: 2020-03-23 | End: 2020-05-22

## 2020-03-23 ASSESSMENT — ANXIETY QUESTIONNAIRES
5. BEING SO RESTLESS THAT IT IS HARD TO SIT STILL: SEVERAL DAYS
GAD7 TOTAL SCORE: 17
7. FEELING AFRAID AS IF SOMETHING AWFUL MIGHT HAPPEN: NEARLY EVERY DAY
1. FEELING NERVOUS, ANXIOUS, OR ON EDGE: NEARLY EVERY DAY
4. TROUBLE RELAXING: SEVERAL DAYS
6. BECOMING EASILY ANNOYED OR IRRITABLE: NEARLY EVERY DAY
2. NOT BEING ABLE TO STOP OR CONTROL WORRYING: NEARLY EVERY DAY
3. WORRYING TOO MUCH ABOUT DIFFERENT THINGS: NEARLY EVERY DAY

## 2020-03-23 ASSESSMENT — PATIENT HEALTH QUESTIONNAIRE - PHQ9
CLINICAL INTERPRETATION OF PHQ2 SCORE: 5
5. POOR APPETITE OR OVEREATING: 3 - NEARLY EVERY DAY
SUM OF ALL RESPONSES TO PHQ QUESTIONS 1-9: 15

## 2020-03-23 ASSESSMENT — FIBROSIS 4 INDEX: FIB4 SCORE: 0.28
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
## 2020-03-23 NOTE — LETTER
[facility]  [facility] [address] [facility] [place]  [address] [facility] [facility] [place] [address]     March 23, 2020    Patient: Nick Jaimes   YOB: 1995   Date of Visit: 3/23/2020             To Whom It May Concern:        Nick Jaimes was seen and treated in our department on 3/23/2020.     Patient is recommended and needs to have pet/dog as emotional support in her household          Sincerely,           Ernie Glaser M.D.

## 2020-06-10 ENCOUNTER — TELEMEDICINE (OUTPATIENT)
Dept: MEDICAL GROUP | Age: 25
End: 2020-06-10
Payer: COMMERCIAL

## 2020-06-10 VITALS — TEMPERATURE: 98.4 F | BODY MASS INDEX: 27.49 KG/M2 | HEIGHT: 65 IN | WEIGHT: 165 LBS

## 2020-06-10 DIAGNOSIS — F51.01 PRIMARY INSOMNIA: ICD-10-CM

## 2020-06-10 DIAGNOSIS — F41.1 GAD (GENERALIZED ANXIETY DISORDER): ICD-10-CM

## 2020-06-10 DIAGNOSIS — Z56.6 STRESS AT WORK: ICD-10-CM

## 2020-06-10 DIAGNOSIS — F43.10 PTSD (POST-TRAUMATIC STRESS DISORDER): ICD-10-CM

## 2020-06-10 DIAGNOSIS — F33.1 MAJOR DEPRESSIVE DISORDER, RECURRENT EPISODE, MODERATE (HCC): ICD-10-CM

## 2020-06-10 DIAGNOSIS — Z00.00 HEALTH CARE MAINTENANCE: ICD-10-CM

## 2020-06-10 PROCEDURE — 99214 OFFICE O/P EST MOD 30 MIN: CPT | Mod: 95,CR | Performed by: INTERNAL MEDICINE

## 2020-06-10 RX ORDER — FLUOXETINE HYDROCHLORIDE 20 MG/1
20 CAPSULE ORAL DAILY
Qty: 30 CAP | Refills: 0 | Status: SHIPPED | OUTPATIENT
Start: 2020-06-10 | End: 2020-06-22 | Stop reason: SDUPTHER

## 2020-06-10 SDOH — HEALTH STABILITY - MENTAL HEALTH: OTHER PHYSICAL AND MENTAL STRAIN RELATED TO WORK: Z56.6

## 2020-06-10 ASSESSMENT — ANXIETY QUESTIONNAIRES
GAD7 TOTAL SCORE: 20
1. FEELING NERVOUS, ANXIOUS, OR ON EDGE: MORE THAN HALF THE DAYS
7. FEELING AFRAID AS IF SOMETHING AWFUL MIGHT HAPPEN: NEARLY EVERY DAY
3. WORRYING TOO MUCH ABOUT DIFFERENT THINGS: NEARLY EVERY DAY
4. TROUBLE RELAXING: NEARLY EVERY DAY
2. NOT BEING ABLE TO STOP OR CONTROL WORRYING: NEARLY EVERY DAY
5. BEING SO RESTLESS THAT IT IS HARD TO SIT STILL: NEARLY EVERY DAY
6. BECOMING EASILY ANNOYED OR IRRITABLE: NEARLY EVERY DAY

## 2020-06-10 ASSESSMENT — PATIENT HEALTH QUESTIONNAIRE - PHQ9
5. POOR APPETITE OR OVEREATING: 0 - NOT AT ALL
SUM OF ALL RESPONSES TO PHQ QUESTIONS 1-9: 16
CLINICAL INTERPRETATION OF PHQ2 SCORE: 5

## 2020-06-10 ASSESSMENT — FIBROSIS 4 INDEX: FIB4 SCORE: 0.28
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
## 2020-06-22 DIAGNOSIS — F41.1 GAD (GENERALIZED ANXIETY DISORDER): ICD-10-CM

## 2020-06-22 DIAGNOSIS — F51.01 PRIMARY INSOMNIA: ICD-10-CM

## 2020-06-22 DIAGNOSIS — F33.1 MAJOR DEPRESSIVE DISORDER, RECURRENT EPISODE, MODERATE (HCC): ICD-10-CM

## 2020-06-22 DIAGNOSIS — F43.10 PTSD (POST-TRAUMATIC STRESS DISORDER): ICD-10-CM

## 2020-06-22 RX ORDER — FLUOXETINE HYDROCHLORIDE 20 MG/1
20 CAPSULE ORAL DAILY
Qty: 30 CAP | Refills: 0 | Status: SHIPPED | OUTPATIENT
Start: 2020-06-22 | End: 2021-09-02

## 2020-09-10 ENCOUNTER — OFFICE VISIT (OUTPATIENT)
Dept: MEDICAL GROUP | Age: 25
End: 2020-09-10
Payer: COMMERCIAL

## 2020-09-10 ENCOUNTER — HOSPITAL ENCOUNTER (OUTPATIENT)
Dept: LAB | Facility: MEDICAL CENTER | Age: 25
End: 2020-09-10
Attending: INTERNAL MEDICINE
Payer: COMMERCIAL

## 2020-09-10 VITALS
OXYGEN SATURATION: 98 % | BODY MASS INDEX: 28.12 KG/M2 | HEIGHT: 65 IN | DIASTOLIC BLOOD PRESSURE: 80 MMHG | WEIGHT: 168.8 LBS | TEMPERATURE: 97.6 F | HEART RATE: 71 BPM | SYSTOLIC BLOOD PRESSURE: 110 MMHG

## 2020-09-10 DIAGNOSIS — Z00.00 HEALTH CARE MAINTENANCE: ICD-10-CM

## 2020-09-10 DIAGNOSIS — R11.0 NAUSEA: ICD-10-CM

## 2020-09-10 DIAGNOSIS — R10.30 LOWER ABDOMINAL PAIN: ICD-10-CM

## 2020-09-10 DIAGNOSIS — N30.00 ACUTE CYSTITIS WITHOUT HEMATURIA: ICD-10-CM

## 2020-09-10 DIAGNOSIS — R51.9 NONINTRACTABLE EPISODIC HEADACHE, UNSPECIFIED HEADACHE TYPE: ICD-10-CM

## 2020-09-10 LAB
APPEARANCE UR: CLEAR
BACTERIA #/AREA URNS HPF: ABNORMAL /HPF
BASOPHILS # BLD AUTO: 0.6 % (ref 0–1.8)
BASOPHILS # BLD: 0.05 K/UL (ref 0–0.12)
BILIRUB UR QL STRIP.AUTO: NEGATIVE
COLOR UR: YELLOW
EOSINOPHIL # BLD AUTO: 0.18 K/UL (ref 0–0.51)
EOSINOPHIL NFR BLD: 2.2 % (ref 0–6.9)
EPI CELLS #/AREA URNS HPF: ABNORMAL /HPF
ERYTHROCYTE [DISTWIDTH] IN BLOOD BY AUTOMATED COUNT: 40.3 FL (ref 35.9–50)
GLUCOSE UR STRIP.AUTO-MCNC: NEGATIVE MG/DL
HCG UR QL: NEGATIVE
HCT VFR BLD AUTO: 45.6 % (ref 37–47)
HGB BLD-MCNC: 14.8 G/DL (ref 12–16)
HYALINE CASTS #/AREA URNS LPF: ABNORMAL /LPF
IMM GRANULOCYTES # BLD AUTO: 0.02 K/UL (ref 0–0.11)
IMM GRANULOCYTES NFR BLD AUTO: 0.2 % (ref 0–0.9)
KETONES UR STRIP.AUTO-MCNC: NEGATIVE MG/DL
LEUKOCYTE ESTERASE UR QL STRIP.AUTO: ABNORMAL
LYMPHOCYTES # BLD AUTO: 2.15 K/UL (ref 1–4.8)
LYMPHOCYTES NFR BLD: 26.3 % (ref 22–41)
MCH RBC QN AUTO: 28.4 PG (ref 27–33)
MCHC RBC AUTO-ENTMCNC: 32.5 G/DL (ref 33.6–35)
MCV RBC AUTO: 87.5 FL (ref 81.4–97.8)
MICRO URNS: ABNORMAL
MONOCYTES # BLD AUTO: 0.71 K/UL (ref 0–0.85)
MONOCYTES NFR BLD AUTO: 8.7 % (ref 0–13.4)
NEUTROPHILS # BLD AUTO: 5.07 K/UL (ref 2–7.15)
NEUTROPHILS NFR BLD: 62 % (ref 44–72)
NITRITE UR QL STRIP.AUTO: NEGATIVE
NRBC # BLD AUTO: 0 K/UL
NRBC BLD-RTO: 0 /100 WBC
PH UR STRIP.AUTO: 6 [PH] (ref 5–8)
PLATELET # BLD AUTO: 369 K/UL (ref 164–446)
PMV BLD AUTO: 10.2 FL (ref 9–12.9)
PROT UR QL STRIP: NEGATIVE MG/DL
RBC # BLD AUTO: 5.21 M/UL (ref 4.2–5.4)
RBC # URNS HPF: ABNORMAL /HPF
RBC UR QL AUTO: NEGATIVE
SP GR UR STRIP.AUTO: 1.02
UROBILINOGEN UR STRIP.AUTO-MCNC: 0.2 MG/DL
WBC # BLD AUTO: 8.2 K/UL (ref 4.8–10.8)
WBC #/AREA URNS HPF: ABNORMAL /HPF

## 2020-09-10 PROCEDURE — 36415 COLL VENOUS BLD VENIPUNCTURE: CPT

## 2020-09-10 PROCEDURE — 81025 URINE PREGNANCY TEST: CPT

## 2020-09-10 PROCEDURE — 81001 URINALYSIS AUTO W/SCOPE: CPT

## 2020-09-10 PROCEDURE — 99214 OFFICE O/P EST MOD 30 MIN: CPT | Performed by: INTERNAL MEDICINE

## 2020-09-10 PROCEDURE — 85025 COMPLETE CBC W/AUTO DIFF WBC: CPT

## 2020-09-10 RX ORDER — CIPROFLOXACIN 500 MG/1
500 TABLET, FILM COATED ORAL 2 TIMES DAILY
Qty: 10 TAB | Refills: 0 | Status: SHIPPED
Start: 2020-09-10 | End: 2021-09-02

## 2020-09-10 ASSESSMENT — FIBROSIS 4 INDEX: FIB4 SCORE: 0.28

## 2020-09-10 NOTE — PROGRESS NOTES
CHIEF COMPLAINT  Chief Complaint   Patient presents with   • Abdominal Pain     spasm, X week   • HA     X one month   • Nausea     X week      HPI  Nick Jaimes is a 24 y.o. female who presents today for the following     Lower abdominal pain, HA  - Onset: ~ 10 days ago, worsening  - located in: lower abdomen  - intensity:  5/10  - quality:  dull   - radiation:  no  - alleviating factors are:  rest  -  exacerbating factors are:  movements  - accompanied:    - mornings nausea   - looses stools w/o blood or mucus   - HA    - Denies: fever, chills, dysuria  - imaging: pending  - treatment: as above    Home pregnancy test was negative yesterday.    Reviewed PMH, PSH, FH, SH, ALL, HCM/IMM, no changes  Reviewed MEDS, no changes    Patient Active Problem List    Diagnosis Date Noted   • ENRRIQUE (generalized anxiety disorder) 06/10/2020   • Major depressive disorder, recurrent episode, moderate (HCC) 06/10/2020   • Primary insomnia 06/10/2020   • Bilateral temporomandibular joint pain 11/20/2019   • Acne vulgaris 10/08/2018   • Hair loss 10/08/2018   • Aphthous ulcer 10/08/2018   • Slow transit constipation 10/08/2018   • Health care maintenance 04/05/2016   • PTSD (post-traumatic stress disorder) 02/03/2016   • Depression with anxiety 02/03/2016   • Family history of gynecological problem 02/03/2016   • History of bulimia 02/03/2016     CURRENT MEDICATIONS  Current Outpatient Medications   Medication Sig Dispense Refill   • FLUoxetine (PROZAC) 20 MG Cap Take 1 Cap by mouth every day. 30 Cap 0     No current facility-administered medications for this visit.      ALLERGIES  Allergies: Patient has no known allergies.  PAST MEDICAL HISTORY  Past Medical History:   Diagnosis Date   • Anxiety    • Depression    • Ovarian cyst      SURGICAL HISTORY  She  has a past surgical history that includes tympanotomy; tubal coagulation laparoscopic bilateral (Bilateral, 11/10/2017); and tubal ligation (2017).  SOCIAL  "HISTORY  Social History     Tobacco Use   • Smoking status: Never Smoker   • Smokeless tobacco: Never Used   Substance Use Topics   • Alcohol use: Not Currently     Alcohol/week: 0.0 oz     Frequency: 2-4 times a month     Drinks per session: 5 or 6     Binge frequency: Monthly     Comment: 3 per month   • Drug use: Yes     Types: Marijuana     Comment: gummies      Social History     Social History Narrative   • Not on file     FAMILY HISTORY  Family History   Problem Relation Age of Onset   • Cancer Mother 35        ovarian cancer   • Psychiatric Illness Mother    • Cancer Maternal Grandmother         breast cancer at age 37 and cervical cancer at age 44   • Diabetes Maternal Grandmother    • Hyperlipidemia Maternal Grandmother    • Heart Disease Maternal Grandmother    • Hypertension Maternal Grandmother      Family Status   Relation Name Status   • Mo  Alive   • Fa  Alive   • MGMo  Alive   • MGFa  Alive   • PGMo  Alive   • PGFa  Alive     ROS   Constitutional: Negative for fever, chills, fatigue.  HENT: Negative for congestion, sore throat.  Eyes: Negative for vision problems.   Respiratory: Negative for cough, shortness of breath.  Cardiovascular: Negative for chest pain, palpitations.   Gastrointestinal: Negative for heartburn, nausea, abdominal pain.   Genitourinary: Negative for dysuria.  Musculoskeletal: Negative for significant myalgia, back and joint pain.   Skin: Negative for rash.   Neuro: Negative for dizziness, weakness and headaches.   Endo/Heme/Allergies: Does not bruise/bleed easily.   Psychiatric/Behavioral: Negative for depression.    PHYSICAL EXAM   Blood Pressure 110/80 (BP Location: Right arm, Patient Position: Sitting, BP Cuff Size: Adult)   Pulse 71   Temperature 36.4 °C (97.6 °F) (Temporal)   Height 1.651 m (5' 5\")   Weight 76.6 kg (168 lb 12.8 oz)   Oxygen Saturation 98%  Body mass index is 28.09 kg/m².  General:  NAD, well appearing  HEENT:   NC/AT, PERRLA, EOMI.  Cardiovascular: " unlabored breathing, no peripheral cyanosis or swelling.     Lungs:   no respiratory distress.  Abdomen: non- distended.  Extremities:  No LE swelling.  Skin:  Warm, dry.  No erythema. No rash.   Neurologic: Alert & oriented x 3. CN II-XII grossly intact. No focal deficits.  Psychiatric:  Affect normal, mood normal, judgment normal.    Labs     Labs are reviewed and discussed with a patient  No results found for: CHOLSTRLTOT, LDL, HDL, TRIGLYCERIDE    Lab Results   Component Value Date/Time    SODIUM 138 10/26/2018 09:23 AM    POTASSIUM 3.9 10/26/2018 09:23 AM    CHLORIDE 106 10/26/2018 09:23 AM    CO2 27 10/26/2018 09:23 AM    GLUCOSE 90 10/26/2018 09:23 AM    BUN 13 10/26/2018 09:23 AM    CREATININE 0.83 10/26/2018 09:23 AM     Lab Results   Component Value Date/Time    ALKPHOSPHAT 64 10/26/2018 09:23 AM    ASTSGOT 15 10/26/2018 09:23 AM    ALTSGPT 15 10/26/2018 09:23 AM    TBILIRUBIN 0.6 10/26/2018 09:23 AM      Lab Results   Component Value Date/Time    FREET4 1.11 10/26/2018 09:23 AM     Lab Results   Component Value Date/Time    WBC 5.7 10/26/2018 09:23 AM    RBC 5.43 (H) 10/26/2018 09:23 AM    HEMOGLOBIN 15.4 10/26/2018 09:23 AM    HEMATOCRIT 47.2 (H) 10/26/2018 09:23 AM    MCV 86.9 10/26/2018 09:23 AM    MCH 28.4 10/26/2018 09:23 AM    MCHC 32.6 (L) 10/26/2018 09:23 AM    MPV 10.2 10/26/2018 09:23 AM    NEUTSPOLYS 50.20 10/26/2018 09:23 AM    LYMPHOCYTES 35.60 10/26/2018 09:23 AM    MONOCYTES 8.20 10/26/2018 09:23 AM    EOSINOPHILS 4.60 10/26/2018 09:23 AM    BASOPHILS 1.20 10/26/2018 09:23 AM      Imaging     Pending    Assessment and Plan     Nick Jaimes is a 24 y.o. female    1. Lower abdominal pain  Advised good fluid intake, diet free diet, probiotic  - CBC WITH DIFFERENTIAL; Future  - URINALYSIS,CULTURE IF INDICATED; Future  - HCG QUALITATIVE UR; Future  - US-ABDOMEN COMPLETE SURVEY; Future    ER precautions  discussed with the patient in detail, she verbalized an understanding    2.  Nausea  Advised at lesat 40 oz of fluids    3. Nonintractable episodic headache, unspecified headache type  Continue excedrine lower leg and ibuprofen, OTC, and will be following    4. Health care maintenance  UTD    Counseling:   - Smoking:  Nonsmoker    Followup: As needed    All questions are answered.    Please note that this dictation was created using voice recognition software, and that there might be errors of remy and possibly content.

## 2020-09-17 ENCOUNTER — NON-PROVIDER VISIT (OUTPATIENT)
Dept: URGENT CARE | Facility: PHYSICIAN GROUP | Age: 25
End: 2020-09-17

## 2020-09-17 DIAGNOSIS — Z11.1 PPD SCREENING TEST: ICD-10-CM

## 2020-09-17 PROCEDURE — 86580 TB INTRADERMAL TEST: CPT | Performed by: PHYSICIAN ASSISTANT

## 2020-09-17 PROCEDURE — 99999 PR NO CHARGE: CPT | Performed by: PHYSICIAN ASSISTANT

## 2020-09-17 NOTE — NON-PROVIDER
Nick Jaimes is a 24 y.o. female here for a non-provider visit for PPD placement -- Step 1 of 1    Reason for PPD:  work requirement    1. TB evaluation questionnaire completed by patient? Yes      -  If any answers marked yes did you contact a provider prior to placing? Not Indicated  2.  Patient notified to return to clinic for reading on: 09/19/2020 or 09/20/2020  3.  PPD Placement documentation completed on TB evaluation questionnaire? Yes  4.  Location of TB evaluation questionnaire filed: [facility]

## 2020-09-20 ENCOUNTER — NON-PROVIDER VISIT (OUTPATIENT)
Dept: URGENT CARE | Facility: PHYSICIAN GROUP | Age: 25
End: 2020-09-20

## 2020-09-20 LAB — TB WHEAL 3D P 5 TU DIAM: 0 MM

## 2020-09-20 NOTE — NON-PROVIDER
Nick Jaimes is a 24 y.o. female here for a non-provider visit for PPD reading -- Step 1 of 1.      1.  Resulted in Epic under enter/edit results? Yes   2.  TB evaluation questionnaire scanned into chart and original given to patient?Yes      3. Was induration greater than 0 mm? No.        Routed to PCP? Yes

## 2020-10-23 ENCOUNTER — APPOINTMENT (OUTPATIENT)
Dept: MEDICAL GROUP | Age: 25
End: 2020-10-23
Payer: COMMERCIAL

## 2021-05-26 ENCOUNTER — NON-PROVIDER VISIT (OUTPATIENT)
Dept: MEDICAL GROUP | Facility: CLINIC | Age: 26
End: 2021-05-26

## 2021-05-26 DIAGNOSIS — Z11.1 PPD SCREENING TEST: ICD-10-CM

## 2021-05-26 DIAGNOSIS — Z02.1 ENCOUNTER FOR PRE-EMPLOYMENT DRUG TESTING: ICD-10-CM

## 2021-05-26 PROCEDURE — 8907 PR URINE COLLECT ONLY: Performed by: PHYSICIAN ASSISTANT

## 2021-05-26 PROCEDURE — 86580 TB INTRADERMAL TEST: CPT | Performed by: PHYSICIAN ASSISTANT

## 2021-05-28 ENCOUNTER — NON-PROVIDER VISIT (OUTPATIENT)
Dept: MEDICAL GROUP | Facility: CLINIC | Age: 26
End: 2021-05-28

## 2021-05-28 LAB — TB WHEAL 3D P 5 TU DIAM: 0 MM

## 2021-09-02 ENCOUNTER — HOSPITAL ENCOUNTER (OUTPATIENT)
Facility: MEDICAL CENTER | Age: 26
End: 2021-09-02
Attending: FAMILY MEDICINE
Payer: OTHER GOVERNMENT

## 2021-09-02 ENCOUNTER — OFFICE VISIT (OUTPATIENT)
Dept: URGENT CARE | Facility: CLINIC | Age: 26
End: 2021-09-02
Payer: OTHER GOVERNMENT

## 2021-09-02 VITALS
WEIGHT: 185 LBS | OXYGEN SATURATION: 97 % | DIASTOLIC BLOOD PRESSURE: 70 MMHG | HEIGHT: 65 IN | TEMPERATURE: 96.6 F | HEART RATE: 65 BPM | BODY MASS INDEX: 30.82 KG/M2 | RESPIRATION RATE: 12 BRPM | SYSTOLIC BLOOD PRESSURE: 116 MMHG

## 2021-09-02 DIAGNOSIS — B34.9 VIRAL ILLNESS: ICD-10-CM

## 2021-09-02 PROCEDURE — U0005 INFEC AGEN DETEC AMPLI PROBE: HCPCS

## 2021-09-02 PROCEDURE — 99213 OFFICE O/P EST LOW 20 MIN: CPT | Performed by: FAMILY MEDICINE

## 2021-09-02 PROCEDURE — U0003 INFECTIOUS AGENT DETECTION BY NUCLEIC ACID (DNA OR RNA); SEVERE ACUTE RESPIRATORY SYNDROME CORONAVIRUS 2 (SARS-COV-2) (CORONAVIRUS DISEASE [COVID-19]), AMPLIFIED PROBE TECHNIQUE, MAKING USE OF HIGH THROUGHPUT TECHNOLOGIES AS DESCRIBED BY CMS-2020-01-R: HCPCS

## 2021-09-02 NOTE — PROGRESS NOTES
"Subjective     Nick Jaimes is a 25 y.o. female who presents with Coronavirus Screening (8/30- loss of taste and smell and congestion, needs test for work )  Mild cough.  Otherwise doing well.  She is vaccinated against Covid.  She was also exposed to Covid at work.  Review of system otherwise negative.          HPI    ROS           Objective     /70   Pulse 65   Temp 35.9 °C (96.6 °F) (Temporal)   Resp 12   Ht 1.651 m (5' 5\")   Wt 83.9 kg (185 lb)   SpO2 97%   BMI 30.79 kg/m²      Physical Exam  Constitutional:       General: She is not in acute distress.     Appearance: She is not ill-appearing, toxic-appearing or diaphoretic.   HENT:      Head: Normocephalic and atraumatic.      Right Ear: External ear normal.      Left Ear: External ear normal.      Mouth/Throat:      Mouth: Mucous membranes are moist.      Pharynx: Oropharynx is clear. No oropharyngeal exudate or posterior oropharyngeal erythema.   Eyes:      Conjunctiva/sclera: Conjunctivae normal.   Cardiovascular:      Rate and Rhythm: Normal rate and regular rhythm.      Heart sounds: No murmur heard.   No friction rub. No gallop.    Pulmonary:      Effort: No respiratory distress.      Breath sounds: No stridor. No wheezing, rhonchi or rales.   Musculoskeletal:      Cervical back: Neck supple.   Skin:     Coloration: Skin is not jaundiced or pale.   Neurological:      Mental Status: She is alert and oriented to person, place, and time.   Psychiatric:         Behavior: Behavior normal.                Assessment & Plan       ASSESSMENT:PLAN:  1. Viral illness    - SARS-CoV-2 PCR (24 hour In-House): Collect NP swab in [facility]; Future      Continue symptomatic care  Plan per orders and instructions  Warning signs reviewed        "

## 2021-09-02 NOTE — PATIENT INSTRUCTIONS
COVID-19  COVID-19 is a respiratory infection that is caused by a virus called severe acute respiratory syndrome coronavirus 2 (SARS-CoV-2). The disease is also known as coronavirus disease or novel coronavirus. In some people, the virus may not cause any symptoms. In others, it may cause a serious infection. The infection can get worse quickly and can lead to complications, such as:  · Pneumonia, or infection of the lungs.  · Acute respiratory distress syndrome or ARDS. This is fluid build-up in the lungs.  · Acute respiratory failure. This is a condition in which there is not enough oxygen passing from the lungs to the body.  · Sepsis or septic shock. This is a serious bodily reaction to an infection.  · Blood clotting problems.  · Secondary infections due to bacteria or fungus.  The virus that causes COVID-19 is contagious. This means that it can spread from person to person through droplets from coughs and sneezes (respiratory secretions).  What are the causes?  This illness is caused by a virus. You may catch the virus by:  · Breathing in droplets from an infected person's cough or sneeze.  · Touching something, like a table or a doorknob, that was exposed to the virus (contaminated) and then touching your mouth, nose, or eyes.  What increases the risk?  Risk for infection  You are more likely to be infected with this virus if you:  · Live in or travel to an area with a COVID-19 outbreak.  · Come in contact with a sick person who recently traveled to an area with a COVID-19 outbreak.  · Provide care for or live with a person who is infected with COVID-19.  Risk for serious illness  You are more likely to become seriously ill from the virus if you:  · Are 65 years of age or older.  · Have a long-term disease that lowers your body's ability to fight infection (immunocompromised).  · Live in a nursing home or long-term care facility.  · Have a long-term (chronic) disease such as:  ? Chronic lung disease, including  chronic obstructive pulmonary disease or asthma  ? Heart disease.  ? Diabetes.  ? Chronic kidney disease.  ? Liver disease.  · Are obese.  What are the signs or symptoms?  Symptoms of this condition can range from mild to severe. Symptoms may appear any time from 2 to 14 days after being exposed to the virus. They include:  · A fever.  · A cough.  · Difficulty breathing.  · Chills.  · Muscle pains.  · A sore throat.  · Loss of taste or smell.  Some people may also have stomach problems, such as nausea, vomiting, or diarrhea.  Other people may not have any symptoms of COVID-19.  How is this diagnosed?  This condition may be diagnosed based on:  · Your signs and symptoms, especially if:  ? You live in an area with a COVID-19 outbreak.  ? You recently traveled to or from an area where the virus is common.  ? You provide care for or live with a person who was diagnosed with COVID-19.  · A physical exam.  · Lab tests, which may include:  ? A nasal swab to take a sample of fluid from your nose.  ? A throat swab to take a sample of fluid from your throat.  ? A sample of mucus from your lungs (sputum).  ? Blood tests.  · Imaging tests, which may include, X-rays, CT scan, or ultrasound.  How is this treated?  At present, there is no medicine to treat COVID-19. Medicines that treat other diseases are being used on a trial basis to see if they are effective against COVID-19.  Your health care provider will talk with you about ways to treat your symptoms. For most people, the infection is mild and can be managed at home with rest, fluids, and over-the-counter medicines.  Treatment for a serious infection usually takes places in a hospital intensive care unit (ICU). It may include one or more of the following treatments. These treatments are given until your symptoms improve.  · Receiving fluids and medicines through an IV.  · Supplemental oxygen. Extra oxygen is given through a tube in the nose, a face mask, or a  bernal.  · Positioning you to lie on your stomach (prone position). This makes it easier for oxygen to get into the lungs.  · Continuous positive airway pressure (CPAP) or bi-level positive airway pressure (BPAP) machine. This treatment uses mild air pressure to keep the airways open. A tube that is connected to a motor delivers oxygen to the body.  · Ventilator. This treatment moves air into and out of the lungs by using a tube that is placed in your windpipe.  · Tracheostomy. This is a procedure to create a hole in the neck so that a breathing tube can be inserted.  · Extracorporeal membrane oxygenation (ECMO). This procedure gives the lungs a chance to recover by taking over the functions of the heart and lungs. It supplies oxygen to the body and removes carbon dioxide.  Follow these instructions at home:  Lifestyle  · If you are sick, stay home except to get medical care. Your health care provider will tell you how long to stay home. Call your health care provider before you go for medical care.  · Rest at home as told by your health care provider.  · Do not use any products that contain nicotine or tobacco, such as cigarettes, e-cigarettes, and chewing tobacco. If you need help quitting, ask your health care provider.  · Return to your normal activities as told by your health care provider. Ask your health care provider what activities are safe for you.  General instructions  · Take over-the-counter and prescription medicines only as told by your health care provider.  · Drink enough fluid to keep your urine pale yellow.  · Keep all follow-up visits as told by your health care provider. This is important.  How is this prevented?    There is no vaccine to help prevent COVID-19 infection. However, there are steps you can take to protect yourself and others from this virus.  To protect yourself:   · Do not travel to areas where COVID-19 is a risk. The areas where COVID-19 is reported change often. To identify  high-risk areas and travel restrictions, check the [facility] travel website: wwwnc.cdc.gov/travel/notices  · If you live in, or must travel to, an area where COVID-19 is a risk, take precautions to avoid infection.  ? Stay away from people who are sick.  ? Wash your hands often with soap and water for 20 seconds. If soap and water are not available, use an alcohol-based hand .  ? Avoid touching your mouth, face, eyes, or nose.  ? Avoid going out in public, follow guidance from your state and local health authorities.  ? If you must go out in public, wear a cloth face covering or face mask.  ? Disinfect objects and surfaces that are frequently touched every day. This may include:  § Counters and tables.  § Doorknobs and light switches.  § Sinks and faucets.  § Electronics, such as phones, remote controls, keyboards, computers, and tablets.  To protect others:  If you have symptoms of COVID-19, take steps to prevent the virus from spreading to others.  · If you think you have a COVID-19 infection, contact your health care provider right away. Tell your health care team that you think you may have a COVID-19 infection.  · Stay home. Leave your house only to seek medical care. Do not use public transport.  · Do not travel while you are sick.  · Wash your hands often with soap and water for 20 seconds. If soap and water are not available, use alcohol-based hand .  · Stay away from other members of your household. Let healthy household members care for children and pets, if possible. If you have to care for children or pets, wash your hands often and wear a mask. If possible, stay in your own room, separate from others. Use a different bathroom.  · Make sure that all people in your household wash their hands well and often.  · Cough or sneeze into a tissue or your sleeve or elbow. Do not cough or sneeze into your hand or into the air.  · Wear a cloth face covering or face mask.  Where to find more  information  · Centers for Disease Control and Prevention: www.cdc.gov/coronavirus/2019-ncov/index.html  · World Health Organization: www.who.int/health-topics/coronavirus  Contact a health care provider if:  · You live in or have traveled to an area where COVID-19 is a risk and you have symptoms of the infection.  · You have had contact with someone who has COVID-19 and you have symptoms of the infection.  Get help right away if:  · You have trouble breathing.  · You have pain or pressure in your chest.  · You have confusion.  · You have bluish lips and fingernails.  · You have difficulty waking from sleep.  · You have symptoms that get worse.  These symptoms may represent a serious problem that is an emergency. Do not wait to see if the symptoms will go away. Get medical help right away. Call your local emergency services (911 in the [place] Do not drive yourself to the hospital. Let the emergency medical personnel know if you think you have COVID-19.  Summary  · COVID-19 is a respiratory infection that is caused by a virus. It is also known as coronavirus disease or novel coronavirus. It can cause serious infections, such as pneumonia, acute respiratory distress syndrome, acute respiratory failure, or sepsis.  · The virus that causes COVID-19 is contagious. This means that it can spread from person to person through droplets from coughs and sneezes.  · You are more likely to develop a serious illness if you are 65 years of age or older, have a weak immunity, live in a nursing home, or have chronic disease.  · There is no medicine to treat COVID-19. Your health care provider will talk with you about ways to treat your symptoms.  · Take steps to protect yourself and others from infection. Wash your hands often and disinfect objects and surfaces that are frequently touched every day. Stay away from people who are sick and wear a mask if you are sick.  This information is not intended to replace advice given to you by  your health care provider. Make sure you discuss any questions you have with your health care provider.  Document Released: 01/23/2020 Document Revised: 05/14/2020 Document Reviewed: 01/23/2020  [employer]vier Patient Education © 2020 [employer]

## 2021-09-03 DIAGNOSIS — B34.9 VIRAL ILLNESS: ICD-10-CM

## 2021-09-03 LAB — COVID ORDER STATUS COVID19: NORMAL

## 2021-09-04 LAB
SARS-COV-2 RNA RESP QL NAA+PROBE: NOTDETECTED
SPECIMEN SOURCE: NORMAL

## 2021-10-28 ENCOUNTER — NON-PROVIDER VISIT (OUTPATIENT)
Dept: URGENT CARE | Facility: CLINIC | Age: 26
End: 2021-10-28

## 2021-10-28 DIAGNOSIS — Z11.1 PPD SCREENING TEST: ICD-10-CM

## 2021-10-28 PROCEDURE — 99999 PR NO CHARGE: CPT | Performed by: PHYSICIAN ASSISTANT

## 2021-10-28 PROCEDURE — 86580 TB INTRADERMAL TEST: CPT | Performed by: PHYSICIAN ASSISTANT

## 2021-10-28 NOTE — NON-PROVIDER
Nick Jaimes is a 25 y.o. female here for a non-provider visit for PPD placement -- Step 1 of 1    Reason for PPD:  work requirement    1. TB evaluation questionnaire completed by patient? Yes      -  If any answers marked yes did you contact a provider prior to placing? Yes  2.  Patient notified to return to clinic for reading on:   10/30/21 ( Saturday) arrive after 1136 am   10/31/21 ( Sunday) arrive before 1136 am   3.  PPD Placement documentation completed on TB evaluation questionnaire? Yes  4.  Location of TB evaluation questionnaire filed: [facility]

## 2021-10-30 ENCOUNTER — NON-PROVIDER VISIT (OUTPATIENT)
Dept: URGENT CARE | Facility: CLINIC | Age: 26
End: 2021-10-30

## 2021-10-30 LAB — TB WHEAL 3D P 5 TU DIAM: 0 MM

## 2021-10-30 NOTE — NON-PROVIDER
Nick Jaimes is a 26 y.o. female here for a non-provider visit for PPD reading -- Step 1 of 1.      1.  Resulted in Epic under enter/edit results? Yes   2.  TB evaluation questionnaire scanned into chart and original given to patient?No      3. Was induration greater than 0 mm? No.    Routed to PCP? No

## 2021-12-10 ENCOUNTER — NON-PROVIDER VISIT (OUTPATIENT)
Dept: OCCUPATIONAL MEDICINE | Facility: CLINIC | Age: 26
End: 2021-12-10

## 2021-12-10 DIAGNOSIS — Z02.1 PRE-EMPLOYMENT DRUG SCREENING: ICD-10-CM

## 2021-12-10 LAB
AMP AMPHETAMINE: NORMAL
COC COCAINE: NORMAL
INT CON NEG: NORMAL
INT CON POS: NORMAL
MET METHAMPHETAMINES: NORMAL
OPI OPIATES: NORMAL
PCP PHENCYCLIDINE: NORMAL
POC DRUG COMMENT 753798-OCCUPATIONAL HEALTH: NEGATIVE
THC: NORMAL

## 2021-12-10 PROCEDURE — 80305 DRUG TEST PRSMV DIR OPT OBS: CPT | Performed by: NURSE PRACTITIONER
